# Patient Record
Sex: MALE | Race: ASIAN | NOT HISPANIC OR LATINO | Employment: FULL TIME | URBAN - METROPOLITAN AREA
[De-identification: names, ages, dates, MRNs, and addresses within clinical notes are randomized per-mention and may not be internally consistent; named-entity substitution may affect disease eponyms.]

---

## 2017-08-31 ENCOUNTER — HOSPITAL ENCOUNTER (OUTPATIENT)
Dept: RADIOLOGY | Facility: OTHER | Age: 42
Discharge: HOME | End: 2017-08-31
Attending: ORTHOPAEDIC SURGERY
Payer: COMMERCIAL

## 2017-08-31 DIAGNOSIS — M25.561 ACUTE PAIN OF RIGHT KNEE: Primary | ICD-10-CM

## 2017-08-31 DIAGNOSIS — M25.561 ACUTE PAIN OF RIGHT KNEE: ICD-10-CM

## 2017-08-31 PROCEDURE — 73721 MRI JNT OF LWR EXTRE W/O DYE: CPT | Mod: 26,RT,, | Performed by: RADIOLOGY

## 2017-08-31 PROCEDURE — 73721 MRI JNT OF LWR EXTRE W/O DYE: CPT | Mod: TC,RT

## 2017-08-31 RX ORDER — MELOXICAM 15 MG/1
15 TABLET ORAL DAILY
Qty: 30 TABLET | Refills: 2 | Status: SHIPPED | OUTPATIENT
Start: 2017-08-31 | End: 2021-12-22

## 2017-08-31 NOTE — TELEPHONE ENCOUNTER
Patient was evaluated my Dr. Valera.Positive IT band syndrome. + kandy sign    Mobic 15 mg once a day prn pain sent to pharmacy

## 2017-09-22 DIAGNOSIS — E78.01 FAMILIAL HYPERCHOLESTEROLEMIA: Primary | ICD-10-CM

## 2017-09-28 ENCOUNTER — LAB VISIT (OUTPATIENT)
Dept: LAB | Facility: OTHER | Age: 42
End: 2017-09-28
Attending: OPTOMETRIST
Payer: COMMERCIAL

## 2017-09-28 DIAGNOSIS — E78.01 FAMILIAL HYPERCHOLESTEROLEMIA: ICD-10-CM

## 2017-09-28 LAB
ALBUMIN SERPL BCP-MCNC: 4.4 G/DL
ALP SERPL-CCNC: 53 U/L
ALT SERPL W/O P-5'-P-CCNC: 20 U/L
ANION GAP SERPL CALC-SCNC: 8 MMOL/L
AST SERPL-CCNC: 16 U/L
BASOPHILS # BLD AUTO: 0.05 K/UL
BASOPHILS NFR BLD: 1 %
BILIRUB SERPL-MCNC: 0.5 MG/DL
BUN SERPL-MCNC: 18 MG/DL
CALCIUM SERPL-MCNC: 10.2 MG/DL
CHLORIDE SERPL-SCNC: 106 MMOL/L
CHOLEST SERPL-MCNC: 253 MG/DL
CHOLEST/HDLC SERPL: 4.3 {RATIO}
CO2 SERPL-SCNC: 26 MMOL/L
CREAT SERPL-MCNC: 1 MG/DL
DIFFERENTIAL METHOD: NORMAL
EOSINOPHIL # BLD AUTO: 0.2 K/UL
EOSINOPHIL NFR BLD: 4.4 %
ERYTHROCYTE [DISTWIDTH] IN BLOOD BY AUTOMATED COUNT: 12.9 %
EST. GFR  (AFRICAN AMERICAN): >60 ML/MIN/1.73 M^2
EST. GFR  (NON AFRICAN AMERICAN): >60 ML/MIN/1.73 M^2
GLUCOSE SERPL-MCNC: 116 MG/DL
HCT VFR BLD AUTO: 42.7 %
HDLC SERPL-MCNC: 59 MG/DL
HDLC SERPL: 23.3 %
HGB BLD-MCNC: 14.1 G/DL
LDLC SERPL CALC-MCNC: 170.8 MG/DL
LYMPHOCYTES # BLD AUTO: 1.7 K/UL
LYMPHOCYTES NFR BLD: 33.9 %
MCH RBC QN AUTO: 29 PG
MCHC RBC AUTO-ENTMCNC: 33 G/DL
MCV RBC AUTO: 88 FL
MONOCYTES # BLD AUTO: 0.3 K/UL
MONOCYTES NFR BLD: 5.5 %
NEUTROPHILS # BLD AUTO: 2.8 K/UL
NEUTROPHILS NFR BLD: 55 %
NONHDLC SERPL-MCNC: 194 MG/DL
PLATELET # BLD AUTO: 207 K/UL
PMV BLD AUTO: 11.3 FL
POTASSIUM SERPL-SCNC: 4.3 MMOL/L
PROT SERPL-MCNC: 7.8 G/DL
RBC # BLD AUTO: 4.87 M/UL
SODIUM SERPL-SCNC: 140 MMOL/L
TRIGL SERPL-MCNC: 116 MG/DL
WBC # BLD AUTO: 5.05 K/UL

## 2017-09-28 PROCEDURE — 80053 COMPREHEN METABOLIC PANEL: CPT

## 2017-09-28 PROCEDURE — 36415 COLL VENOUS BLD VENIPUNCTURE: CPT

## 2017-09-28 PROCEDURE — 85025 COMPLETE CBC W/AUTO DIFF WBC: CPT

## 2017-09-28 PROCEDURE — 80061 LIPID PANEL: CPT

## 2017-11-30 ENCOUNTER — OFFICE VISIT (OUTPATIENT)
Dept: PULMONOLOGY | Facility: CLINIC | Age: 42
End: 2017-11-30
Payer: COMMERCIAL

## 2017-11-30 VITALS
HEART RATE: 65 BPM | WEIGHT: 147.69 LBS | BODY MASS INDEX: 21.87 KG/M2 | OXYGEN SATURATION: 98 % | HEIGHT: 69 IN | SYSTOLIC BLOOD PRESSURE: 112 MMHG | DIASTOLIC BLOOD PRESSURE: 64 MMHG

## 2017-11-30 DIAGNOSIS — Z00.00 ANNUAL PHYSICAL EXAM: Primary | ICD-10-CM

## 2017-11-30 PROCEDURE — 99386 PREV VISIT NEW AGE 40-64: CPT | Mod: S$GLB,,, | Performed by: INTERNAL MEDICINE

## 2017-11-30 PROCEDURE — 99999 PR PBB SHADOW E&M-EST. PATIENT-LVL III: CPT | Mod: PBBFAC,,, | Performed by: INTERNAL MEDICINE

## 2017-11-30 NOTE — PROGRESS NOTES
Subjective:       Patient ID: Caroline Raphael MD is a 42 y.o. male.    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 He feels well, has given up jogging because of a pain in his knee. A work up has been negative. No other medical complaints. He did his labs in September and has an elevated cholesterol and LDL, all other parameters are normal. He was intolerant to statins. Will try diet, repeat labs in mid  January and see if he needs a medication.  Review of Systems   Constitutional: Negative.    HENT: Negative.    Eyes: Negative.    Respiratory: Negative.    Cardiovascular: Negative.         Type II hyperlipidemia   Gastrointestinal: Negative.    Genitourinary: Negative.    Musculoskeletal: Negative.         Sore knee aggravated by running   Skin: Negative.    Neurological: Negative.    Psychiatric/Behavioral: Negative.    All other systems reviewed and are negative.      Objective:      Physical Exam   Constitutional: He is oriented to person, place, and time. He appears well-developed and well-nourished.   HENT:   Head: Normocephalic and atraumatic.   Right Ear: External ear normal.   Left Ear: External ear normal.   Eyes: Conjunctivae and EOM are normal. Pupils are equal, round, and reactive to light.   Neck: Normal range of motion. Neck supple.   Cardiovascular: Normal rate, regular rhythm and normal heart sounds.    Pulmonary/Chest: Effort normal and breath sounds normal.   Peak flow 600 l/min   Abdominal: Soft. Bowel sounds are normal.   Musculoskeletal: Normal range of motion.   Neurological: He is alert and oriented to person, place, and time. He has normal reflexes.   Skin: Skin is warm and dry.   Psychiatric: He has a normal mood and affect. His behavior is normal. Judgment and thought content normal.       Assessment:       No diagnosis found.    Plan:        Labs in September, [id number], Cholesterol:253 and Triglycerides 171.  IMP: Type II  hyperlipidemia. Address with diet. Fit For Duty

## 2017-11-30 NOTE — LETTER
November 30, 2017    Caroline Raphael MD  [address] [facility] [address]             [facility] - Pul[facility] Services  [address]  [place] [address]  Phone: [phone] Dear Caroline,      Thank you for allowing me to serve you and perform your Executive Health exam on 11/30/2017. This letter will serve as a brief summary of the physical findings and laboratory/studies performed and recommendations at this time. At this visit the only abnormality is the cholesterol and LDL values. This should initially be addressed with diet and in mid January, repeat the lipid panel and then we can decide if you need a medication.          If you have any questions or concerns, please don't hesitate to call.    Sincerely,        Rock De La Cruz MD

## 2017-12-15 DIAGNOSIS — R10.11 RUQ PAIN: Primary | ICD-10-CM

## 2017-12-27 DIAGNOSIS — R10.11 RUQ PAIN: Primary | ICD-10-CM

## 2017-12-28 ENCOUNTER — HOSPITAL ENCOUNTER (OUTPATIENT)
Dept: RADIOLOGY | Facility: OTHER | Age: 42
Discharge: HOME | End: 2017-12-28
Attending: SPECIALIST
Payer: COMMERCIAL

## 2017-12-28 DIAGNOSIS — R10.11 RUQ PAIN: ICD-10-CM

## 2017-12-28 PROCEDURE — 76705 ECHO EXAM OF ABDOMEN: CPT | Mod: TC

## 2017-12-28 PROCEDURE — 76705 ECHO EXAM OF ABDOMEN: CPT | Mod: 26,,, | Performed by: RADIOLOGY

## 2018-06-21 ENCOUNTER — PATIENT MESSAGE (OUTPATIENT)
Dept: INFECTIOUS DISEASES | Facility: CLINIC | Age: 43
End: 2018-06-21

## 2019-06-04 DIAGNOSIS — R11.0 NAUSEA: Primary | ICD-10-CM

## 2019-06-04 RX ORDER — ONDANSETRON 4 MG/1
4 TABLET, FILM COATED ORAL 2 TIMES DAILY
Qty: 30 TABLET | Refills: 0 | Status: SHIPPED | OUTPATIENT
Start: 2019-06-04 | End: 2021-12-22

## 2019-06-04 RX ORDER — ONDANSETRON 4 MG/1
4 TABLET, FILM COATED ORAL 2 TIMES DAILY
Qty: 30 TABLET | Refills: 0 | Status: SHIPPED | OUTPATIENT
Start: 2019-06-04 | End: 2019-06-04 | Stop reason: SDUPTHER

## 2019-07-29 DIAGNOSIS — M76.31 IT BAND SYNDROME, RIGHT: Primary | ICD-10-CM

## 2019-07-29 RX ORDER — DICLOFENAC SODIUM 10 MG/G
2 GEL TOPICAL 4 TIMES DAILY
Qty: 1 TUBE | Refills: 2 | Status: SHIPPED | OUTPATIENT
Start: 2019-07-29 | End: 2021-12-22

## 2019-09-11 ENCOUNTER — CLINICAL SUPPORT (OUTPATIENT)
Dept: INTERNAL MEDICINE | Facility: CLINIC | Age: 44
End: 2019-09-11
Payer: COMMERCIAL

## 2019-09-11 DIAGNOSIS — Z00.00 ROUTINE GENERAL MEDICAL EXAMINATION AT A HEALTH CARE FACILITY: Primary | ICD-10-CM

## 2019-09-11 LAB
ALBUMIN SERPL BCP-MCNC: 4.5 G/DL (ref 3.5–5.2)
ALP SERPL-CCNC: 61 U/L (ref 55–135)
ALT SERPL W/O P-5'-P-CCNC: 15 U/L (ref 10–44)
ANION GAP SERPL CALC-SCNC: 8 MMOL/L (ref 8–16)
AST SERPL-CCNC: 17 U/L (ref 10–40)
BILIRUB SERPL-MCNC: 0.8 MG/DL (ref 0.1–1)
BUN SERPL-MCNC: 18 MG/DL (ref 6–20)
CALCIUM SERPL-MCNC: 10.3 MG/DL (ref 8.7–10.5)
CHLORIDE SERPL-SCNC: 106 MMOL/L (ref 95–110)
CHOLEST SERPL-MCNC: 220 MG/DL (ref 120–199)
CHOLEST/HDLC SERPL: 2.9 {RATIO} (ref 2–5)
CO2 SERPL-SCNC: 27 MMOL/L (ref 23–29)
CREAT SERPL-MCNC: 1.1 MG/DL (ref 0.5–1.4)
ERYTHROCYTE [DISTWIDTH] IN BLOOD BY AUTOMATED COUNT: 13 % (ref 11.5–14.5)
EST. GFR  (AFRICAN AMERICAN): >60 ML/MIN/1.73 M^2
EST. GFR  (NON AFRICAN AMERICAN): >60 ML/MIN/1.73 M^2
ESTIMATED AVG GLUCOSE: 105 MG/DL (ref 68–131)
GLUCOSE SERPL-MCNC: 90 MG/DL (ref 70–110)
HBA1C MFR BLD HPLC: 5.3 % (ref 4–5.6)
HCT VFR BLD AUTO: 46.4 % (ref 40–54)
HDLC SERPL-MCNC: 76 MG/DL (ref 40–75)
HDLC SERPL: 34.5 % (ref 20–50)
HGB BLD-MCNC: 14.6 G/DL (ref 14–18)
LDLC SERPL CALC-MCNC: 126.6 MG/DL (ref 63–159)
MCH RBC QN AUTO: 28.7 PG (ref 27–31)
MCHC RBC AUTO-ENTMCNC: 31.5 G/DL (ref 32–36)
MCV RBC AUTO: 91 FL (ref 82–98)
NONHDLC SERPL-MCNC: 144 MG/DL
PLATELET # BLD AUTO: 229 K/UL (ref 150–350)
PMV BLD AUTO: 10.2 FL (ref 9.2–12.9)
POTASSIUM SERPL-SCNC: 4.3 MMOL/L (ref 3.5–5.1)
PROT SERPL-MCNC: 7.6 G/DL (ref 6–8.4)
RBC # BLD AUTO: 5.08 M/UL (ref 4.6–6.2)
SODIUM SERPL-SCNC: 141 MMOL/L (ref 136–145)
TRIGL SERPL-MCNC: 87 MG/DL (ref 30–150)
TSH SERPL DL<=0.005 MIU/L-ACNC: 0.89 UIU/ML (ref 0.4–4)
WBC # BLD AUTO: 11.26 K/UL (ref 3.9–12.7)

## 2019-09-11 PROCEDURE — 85027 COMPLETE CBC AUTOMATED: CPT

## 2019-09-11 PROCEDURE — 80053 COMPREHEN METABOLIC PANEL: CPT

## 2019-09-11 PROCEDURE — 36415 COLL VENOUS BLD VENIPUNCTURE: CPT

## 2019-09-11 PROCEDURE — 80061 LIPID PANEL: CPT

## 2019-09-11 PROCEDURE — 84443 ASSAY THYROID STIM HORMONE: CPT

## 2019-09-11 PROCEDURE — 83036 HEMOGLOBIN GLYCOSYLATED A1C: CPT

## 2019-09-12 ENCOUNTER — OFFICE VISIT (OUTPATIENT)
Dept: PULMONOLOGY | Facility: CLINIC | Age: 44
End: 2019-09-12
Payer: COMMERCIAL

## 2019-09-12 VITALS
HEIGHT: 69 IN | BODY MASS INDEX: 20.59 KG/M2 | HEART RATE: 89 BPM | SYSTOLIC BLOOD PRESSURE: 120 MMHG | DIASTOLIC BLOOD PRESSURE: 80 MMHG | WEIGHT: 139 LBS

## 2019-09-12 DIAGNOSIS — Z00.00 ANNUAL PHYSICAL EXAM: Primary | ICD-10-CM

## 2019-09-12 PROCEDURE — 99999 PR PBB SHADOW E&M-EST. PATIENT-LVL III: ICD-10-PCS | Mod: PBBFAC,,, | Performed by: INTERNAL MEDICINE

## 2019-09-12 PROCEDURE — 99386 PR PREVENTIVE VISIT,NEW,40-64: ICD-10-PCS | Mod: S$GLB,,, | Performed by: INTERNAL MEDICINE

## 2019-09-12 PROCEDURE — 99999 PR PBB SHADOW E&M-EST. PATIENT-LVL III: CPT | Mod: PBBFAC,,, | Performed by: INTERNAL MEDICINE

## 2019-09-12 PROCEDURE — 99386 PREV VISIT NEW AGE 40-64: CPT | Mod: S$GLB,,, | Performed by: INTERNAL MEDICINE

## 2019-09-12 NOTE — LETTER
September 12, 2019    Caroline Raphael MD  [address] [facility] [address]             [facility] - Pulmonary Services  [address]  [place] [address]  Phone: [phone] Dear Caroline,     Thank you for allowing me to serve you and perform your Executive Health exam on 9/12/2019. This letter will serve as a brief summary of the physical findings and laboratory/studies performed and recommendations at this time. Today's assessment is essentially normal. You have done a great job improving your lipids.     Talk to Pato, he can make it happen.      If you have any questions or concerns, please don't hesitate to call.    Sincerely,        Rock De La Cruz MD

## 2019-09-12 NOTE — LETTER
September 12, 2019    Caroline Raphael MD  [address] [facility] [address]             [facility] - Pulmonary Services  [address]  [place] [address]  Phone: [phone] Dear Caroline,      Thank you for allowing me to serve you and perform your Executive Health exam on 9/12/2019. This letter will serve as a brief summary of the physical findings and laboratory/studies performed and recommendations at this time. Today's assessment is essentially normal.  You have had a dramatic improvement in your lipid profile. Good job.     See Pato, He can make it happen.        If you have any questions or concerns, please don't hesitate to call.    Sincerely,        Rock De La Cruz MD

## 2019-09-12 NOTE — PROGRESS NOTES
Subjective:       Patient ID: Caroline Raphael MD is a 44 y.o. male.    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 He feels well, has changes his diet and exercising a bit more at the [facility] that is across the street from his house. Takes no prescription meds and has no medical complaints.  Review of Systems   Constitutional: Negative.    HENT: Negative.    Eyes: Negative.    Respiratory: Negative.    Cardiovascular: Negative.    Gastrointestinal: Negative.    Genitourinary: Negative.    Musculoskeletal: Negative.    Skin: Negative.    Neurological: Negative.    Psychiatric/Behavioral: Negative.    All other systems reviewed and are negative.      Objective:      Physical Exam   Constitutional: He is oriented to person, place, and time. He appears well-developed and well-nourished.   HENT:   Head: Normocephalic and atraumatic.   Right Ear: External ear normal.   Left Ear: External ear normal.   Eyes: Pupils are equal, round, and reactive to light. Conjunctivae and EOM are normal.   Neck: Normal range of motion. Neck supple.   Cardiovascular: Normal rate, regular rhythm and normal heart sounds.   Pulmonary/Chest: Effort normal and breath sounds normal.   Peak flow 650 l/min   Abdominal: Soft. Bowel sounds are normal.   Musculoskeletal: Normal range of motion.   Neurological: He is alert and oriented to person, place, and time. He has normal reflexes.   Skin: Skin is warm and dry.   Psychiatric: He has a normal mood and affect. His behavior is normal. Judgment and thought content normal.       Assessment:       1. Annual physical exam        Plan:       Labs: Cholesterol: 220 down from 253 and LDL: 127  Down from 171. All other parameters are normal. IMP: Healthy male with much improved lipid profile.

## 2020-01-30 ENCOUNTER — HOSPITAL ENCOUNTER (OUTPATIENT)
Dept: RADIOLOGY | Facility: OTHER | Age: 45
Discharge: HOME | End: 2020-01-30
Attending: ORTHOPAEDIC SURGERY
Payer: COMMERCIAL

## 2020-01-30 DIAGNOSIS — S83.271A COMPLEX TEAR OF LATERAL MENISCUS, CURRENT INJURY, RIGHT KNEE, INITIAL ENCOUNTER: ICD-10-CM

## 2020-01-30 DIAGNOSIS — M25.561 PAIN IN RIGHT KNEE: ICD-10-CM

## 2020-01-30 PROCEDURE — 73721 MRI JNT OF LWR EXTRE W/O DYE: CPT | Mod: TC,RT

## 2020-01-30 PROCEDURE — 73721 MRI JNT OF LWR EXTRE W/O DYE: CPT | Mod: 26,RT,, | Performed by: RADIOLOGY

## 2020-01-30 PROCEDURE — 73721 MRI KNEE WITHOUT CONTRAST RIGHT: ICD-10-PCS | Mod: 26,RT,, | Performed by: RADIOLOGY

## 2020-04-21 DIAGNOSIS — Z01.84 ANTIBODY RESPONSE EXAMINATION: ICD-10-CM

## 2020-04-22 ENCOUNTER — LAB VISIT (OUTPATIENT)
Dept: LAB | Facility: OTHER | Age: 45
End: 2020-04-22
Attending: INTERNAL MEDICINE
Payer: COMMERCIAL

## 2020-04-22 DIAGNOSIS — Z01.84 ANTIBODY RESPONSE EXAMINATION: ICD-10-CM

## 2020-04-22 LAB — SARS-COV-2 IGG SERPL QL IA: NEGATIVE

## 2020-04-22 PROCEDURE — 36415 COLL VENOUS BLD VENIPUNCTURE: CPT

## 2020-04-22 PROCEDURE — 86769 SARS-COV-2 COVID-19 ANTIBODY: CPT

## 2020-05-11 ENCOUNTER — DOCUMENTATION ONLY (OUTPATIENT)
Dept: PAIN MEDICINE | Facility: CLINIC | Age: 45
End: 2020-05-11

## 2020-11-01 RX ORDER — TERBINAFINE HYDROCHLORIDE 250 MG/1
250 TABLET ORAL DAILY
Qty: 90 TABLET | Refills: 0 | Status: SHIPPED | OUTPATIENT
Start: 2020-11-01 | End: 2021-01-30

## 2020-11-23 ENCOUNTER — LAB VISIT (OUTPATIENT)
Dept: LAB | Facility: OTHER | Age: 45
End: 2020-11-23
Attending: OPHTHALMOLOGY
Payer: COMMERCIAL

## 2020-11-23 DIAGNOSIS — Z01.84 ENCOUNTER FOR ANTIBODY RESPONSE EXAMINATION: ICD-10-CM

## 2020-11-23 LAB — SARS-COV-2 IGG SERPLBLD QL IA.RAPID: NEGATIVE

## 2020-11-23 PROCEDURE — 86769 SARS-COV-2 COVID-19 ANTIBODY: CPT

## 2020-11-23 PROCEDURE — 36415 COLL VENOUS BLD VENIPUNCTURE: CPT

## 2020-12-23 ENCOUNTER — IMMUNIZATION (OUTPATIENT)
Dept: INTERNAL MEDICINE | Facility: CLINIC | Age: 45
End: 2020-12-23
Payer: COMMERCIAL

## 2020-12-23 DIAGNOSIS — Z23 NEED FOR VACCINATION: ICD-10-CM

## 2020-12-23 PROCEDURE — 91300 COVID-19, MRNA, LNP-S, PF, 30 MCG/0.3 ML DOSE VACCINE: CPT | Mod: ,,, | Performed by: INTERNAL MEDICINE

## 2020-12-23 PROCEDURE — 0001A COVID-19, MRNA, LNP-S, PF, 30 MCG/0.3 ML DOSE VACCINE: CPT | Mod: CV19,,, | Performed by: INTERNAL MEDICINE

## 2020-12-23 PROCEDURE — 91300 COVID-19, MRNA, LNP-S, PF, 30 MCG/0.3 ML DOSE VACCINE: ICD-10-PCS | Mod: ,,, | Performed by: INTERNAL MEDICINE

## 2020-12-23 PROCEDURE — 0001A COVID-19, MRNA, LNP-S, PF, 30 MCG/0.3 ML DOSE VACCINE: ICD-10-PCS | Mod: CV19,,, | Performed by: INTERNAL MEDICINE

## 2020-12-24 ENCOUNTER — CLINICAL SUPPORT (OUTPATIENT)
Dept: URGENT CARE | Facility: CLINIC | Age: 45
End: 2020-12-24
Payer: COMMERCIAL

## 2020-12-24 DIAGNOSIS — Z11.9 SCREENING EXAMINATION FOR UNSPECIFIED INFECTIOUS DISEASE: Primary | ICD-10-CM

## 2020-12-24 LAB
CTP QC/QA: YES
SARS-COV-2 RDRP RESP QL NAA+PROBE: NEGATIVE

## 2020-12-24 PROCEDURE — U0002: ICD-10-PCS | Mod: QW,S$GLB,, | Performed by: FAMILY MEDICINE

## 2020-12-24 PROCEDURE — U0002 COVID-19 LAB TEST NON-CDC: HCPCS | Mod: QW,S$GLB,, | Performed by: FAMILY MEDICINE

## 2020-12-24 PROCEDURE — 99211 PR OFFICE/OUTPT VISIT, EST, LEVL I: ICD-10-PCS | Mod: S$GLB,CS,, | Performed by: FAMILY MEDICINE

## 2020-12-24 PROCEDURE — 99211 OFF/OP EST MAY X REQ PHY/QHP: CPT | Mod: S$GLB,CS,, | Performed by: FAMILY MEDICINE

## 2021-04-13 ENCOUNTER — OFFICE VISIT (OUTPATIENT)
Dept: SLEEP MEDICINE | Facility: CLINIC | Age: 46
End: 2021-04-13
Payer: COMMERCIAL

## 2021-04-13 VITALS
HEART RATE: 60 BPM | WEIGHT: 139 LBS | BODY MASS INDEX: 20.59 KG/M2 | HEIGHT: 69 IN | SYSTOLIC BLOOD PRESSURE: 117 MMHG | DIASTOLIC BLOOD PRESSURE: 74 MMHG

## 2021-04-13 DIAGNOSIS — R06.83 SNORING: ICD-10-CM

## 2021-04-13 DIAGNOSIS — R53.83 FATIGUE, UNSPECIFIED TYPE: Primary | ICD-10-CM

## 2021-04-13 PROCEDURE — 1126F PR PAIN SEVERITY QUANTIFIED, NO PAIN PRESENT: ICD-10-PCS | Mod: S$GLB,,, | Performed by: INTERNAL MEDICINE

## 2021-04-13 PROCEDURE — 99204 OFFICE O/P NEW MOD 45 MIN: CPT | Mod: S$GLB,,, | Performed by: INTERNAL MEDICINE

## 2021-04-13 PROCEDURE — 99999 PR PBB SHADOW E&M-EST. PATIENT-LVL III: ICD-10-PCS | Mod: PBBFAC,,, | Performed by: INTERNAL MEDICINE

## 2021-04-13 PROCEDURE — 1126F AMNT PAIN NOTED NONE PRSNT: CPT | Mod: S$GLB,,, | Performed by: INTERNAL MEDICINE

## 2021-04-13 PROCEDURE — 99204 PR OFFICE/OUTPT VISIT, NEW, LEVL IV, 45-59 MIN: ICD-10-PCS | Mod: S$GLB,,, | Performed by: INTERNAL MEDICINE

## 2021-04-13 PROCEDURE — 3008F BODY MASS INDEX DOCD: CPT | Mod: CPTII,S$GLB,, | Performed by: INTERNAL MEDICINE

## 2021-04-13 PROCEDURE — 99999 PR PBB SHADOW E&M-EST. PATIENT-LVL III: CPT | Mod: PBBFAC,,, | Performed by: INTERNAL MEDICINE

## 2021-04-13 PROCEDURE — 3008F PR BODY MASS INDEX (BMI) DOCUMENTED: ICD-10-PCS | Mod: CPTII,S$GLB,, | Performed by: INTERNAL MEDICINE

## 2021-04-15 ENCOUNTER — TELEPHONE (OUTPATIENT)
Dept: SLEEP MEDICINE | Facility: OTHER | Age: 46
End: 2021-04-15

## 2021-04-30 ENCOUNTER — TELEPHONE (OUTPATIENT)
Dept: SLEEP MEDICINE | Facility: OTHER | Age: 46
End: 2021-04-30

## 2021-05-20 ENCOUNTER — TELEPHONE (OUTPATIENT)
Dept: SLEEP MEDICINE | Facility: OTHER | Age: 46
End: 2021-05-20

## 2021-06-08 ENCOUNTER — TELEPHONE (OUTPATIENT)
Dept: SLEEP MEDICINE | Facility: OTHER | Age: 46
End: 2021-06-08

## 2021-06-17 ENCOUNTER — TELEPHONE (OUTPATIENT)
Dept: SLEEP MEDICINE | Facility: OTHER | Age: 46
End: 2021-06-17

## 2021-07-19 ENCOUNTER — LAB VISIT (OUTPATIENT)
Dept: LAB | Facility: HOSPITAL | Age: 46
End: 2021-07-19
Attending: OPHTHALMOLOGY
Payer: COMMERCIAL

## 2021-07-19 DIAGNOSIS — Z20.822 ENCOUNTER FOR LABORATORY TESTING FOR COVID-19 VIRUS: ICD-10-CM

## 2021-07-19 LAB — SARS-COV-2 RDRP RESP QL NAA+PROBE: NEGATIVE

## 2021-07-19 PROCEDURE — U0002 COVID-19 LAB TEST NON-CDC: HCPCS | Performed by: OPHTHALMOLOGY

## 2021-08-13 ENCOUNTER — HOSPITAL ENCOUNTER (OUTPATIENT)
Dept: PREADMISSION TESTING | Facility: OTHER | Age: 46
Discharge: HOME | End: 2021-08-13
Attending: ANESTHESIOLOGY
Payer: COMMERCIAL

## 2021-08-13 DIAGNOSIS — R05.9 COUGH: ICD-10-CM

## 2021-08-13 DIAGNOSIS — Z01.818 PRE-OP TESTING: ICD-10-CM

## 2021-08-13 LAB — SARS-COV-2 RDRP RESP QL NAA+PROBE: NEGATIVE

## 2021-08-13 PROCEDURE — U0002 COVID-19 LAB TEST NON-CDC: HCPCS | Performed by: OPHTHALMOLOGY

## 2021-08-16 ENCOUNTER — HOSPITAL ENCOUNTER (OUTPATIENT)
Dept: PREADMISSION TESTING | Facility: OTHER | Age: 46
Discharge: HOME | End: 2021-08-16
Attending: OPHTHALMOLOGY
Payer: COMMERCIAL

## 2021-08-16 DIAGNOSIS — R51.9 HEAD ACHE: ICD-10-CM

## 2021-08-16 DIAGNOSIS — R05.9 COUGH: ICD-10-CM

## 2021-08-16 DIAGNOSIS — Z01.818 PRE-OP TESTING: ICD-10-CM

## 2021-08-16 LAB — SARS-COV-2 RDRP RESP QL NAA+PROBE: NEGATIVE

## 2021-08-16 PROCEDURE — U0002 COVID-19 LAB TEST NON-CDC: HCPCS | Performed by: OPHTHALMOLOGY

## 2021-09-17 ENCOUNTER — IMMUNIZATION (OUTPATIENT)
Dept: INTERNAL MEDICINE | Facility: CLINIC | Age: 46
End: 2021-09-17
Payer: COMMERCIAL

## 2021-09-17 DIAGNOSIS — Z23 NEED FOR VACCINATION: Primary | ICD-10-CM

## 2021-09-17 PROCEDURE — 91300 COVID-19, MRNA, LNP-S, PF, 30 MCG/0.3 ML DOSE VACCINE: CPT | Mod: ,,, | Performed by: INTERNAL MEDICINE

## 2021-09-17 PROCEDURE — 0003A COVID-19, MRNA, LNP-S, PF, 30 MCG/0.3 ML DOSE VACCINE: ICD-10-PCS | Mod: CV19,,, | Performed by: INTERNAL MEDICINE

## 2021-09-17 PROCEDURE — 0003A COVID-19, MRNA, LNP-S, PF, 30 MCG/0.3 ML DOSE VACCINE: CPT | Mod: CV19,,, | Performed by: INTERNAL MEDICINE

## 2021-09-17 PROCEDURE — 91300 COVID-19, MRNA, LNP-S, PF, 30 MCG/0.3 ML DOSE VACCINE: ICD-10-PCS | Mod: ,,, | Performed by: INTERNAL MEDICINE

## 2021-12-22 ENCOUNTER — HOSPITAL ENCOUNTER (OUTPATIENT)
Dept: PREADMISSION TESTING | Facility: OTHER | Age: 46
Discharge: HOME | End: 2021-12-22
Attending: ANESTHESIOLOGY

## 2021-12-22 ENCOUNTER — OFFICE VISIT (OUTPATIENT)
Dept: INTERNAL MEDICINE | Facility: CLINIC | Age: 46
End: 2021-12-22
Payer: COMMERCIAL

## 2021-12-22 ENCOUNTER — PATIENT MESSAGE (OUTPATIENT)
Dept: INTERNAL MEDICINE | Facility: CLINIC | Age: 46
End: 2021-12-22
Payer: COMMERCIAL

## 2021-12-22 ENCOUNTER — CLINICAL SUPPORT (OUTPATIENT)
Dept: INTERNAL MEDICINE | Facility: CLINIC | Age: 46
End: 2021-12-22
Payer: COMMERCIAL

## 2021-12-22 VITALS
BODY MASS INDEX: 22.1 KG/M2 | WEIGHT: 149.19 LBS | DIASTOLIC BLOOD PRESSURE: 75 MMHG | HEIGHT: 69 IN | HEART RATE: 56 BPM | SYSTOLIC BLOOD PRESSURE: 110 MMHG

## 2021-12-22 DIAGNOSIS — R69 SICK: Primary | ICD-10-CM

## 2021-12-22 DIAGNOSIS — B35.1 ONYCHOMYCOSIS: Primary | ICD-10-CM

## 2021-12-22 DIAGNOSIS — Z12.11 COLON CANCER SCREENING: ICD-10-CM

## 2021-12-22 DIAGNOSIS — Z00.00 ROUTINE GENERAL MEDICAL EXAMINATION AT A HEALTH CARE FACILITY: Primary | ICD-10-CM

## 2021-12-22 DIAGNOSIS — Z00.00 ENCOUNTER FOR ANNUAL HEALTH EXAMINATION: ICD-10-CM

## 2021-12-22 LAB
ALBUMIN SERPL BCP-MCNC: 4.1 G/DL (ref 3.5–5.2)
ALP SERPL-CCNC: 56 U/L (ref 55–135)
ALT SERPL W/O P-5'-P-CCNC: 26 U/L (ref 10–44)
ANION GAP SERPL CALC-SCNC: 9 MMOL/L (ref 8–16)
AST SERPL-CCNC: 19 U/L (ref 10–40)
BILIRUB SERPL-MCNC: 0.4 MG/DL (ref 0.1–1)
BUN SERPL-MCNC: 22 MG/DL (ref 6–20)
CALCIUM SERPL-MCNC: 9.8 MG/DL (ref 8.7–10.5)
CHLORIDE SERPL-SCNC: 108 MMOL/L (ref 95–110)
CHOLEST SERPL-MCNC: 257 MG/DL (ref 120–199)
CHOLEST/HDLC SERPL: 3.6 {RATIO} (ref 2–5)
CO2 SERPL-SCNC: 25 MMOL/L (ref 23–29)
CREAT SERPL-MCNC: 1 MG/DL (ref 0.5–1.4)
ERYTHROCYTE [DISTWIDTH] IN BLOOD BY AUTOMATED COUNT: 12.3 % (ref 11.5–14.5)
EST. GFR  (AFRICAN AMERICAN): >60 ML/MIN/1.73 M^2
EST. GFR  (NON AFRICAN AMERICAN): >60 ML/MIN/1.73 M^2
ESTIMATED AVG GLUCOSE: 111 MG/DL (ref 68–131)
GLUCOSE SERPL-MCNC: 89 MG/DL (ref 70–110)
HBA1C MFR BLD: 5.5 % (ref 4–5.6)
HCT VFR BLD AUTO: 41.2 % (ref 40–54)
HCV AB SERPL QL IA: NEGATIVE
HDLC SERPL-MCNC: 71 MG/DL (ref 40–75)
HDLC SERPL: 27.6 % (ref 20–50)
HGB BLD-MCNC: 13.4 G/DL (ref 14–18)
LDLC SERPL CALC-MCNC: 165.6 MG/DL (ref 63–159)
MCH RBC QN AUTO: 28.7 PG (ref 27–31)
MCHC RBC AUTO-ENTMCNC: 32.5 G/DL (ref 32–36)
MCV RBC AUTO: 88 FL (ref 82–98)
NONHDLC SERPL-MCNC: 186 MG/DL
PLATELET # BLD AUTO: 230 K/UL (ref 150–450)
PMV BLD AUTO: 10.8 FL (ref 9.2–12.9)
POTASSIUM SERPL-SCNC: 4.7 MMOL/L (ref 3.5–5.1)
PROT SERPL-MCNC: 6.9 G/DL (ref 6–8.4)
RBC # BLD AUTO: 4.67 M/UL (ref 4.6–6.2)
SARS-COV-2 RNA RESP QL NAA+PROBE: NOT DETECTED
SARS-COV-2- CYCLE NUMBER: NORMAL
SODIUM SERPL-SCNC: 142 MMOL/L (ref 136–145)
TRIGL SERPL-MCNC: 102 MG/DL (ref 30–150)
TSH SERPL DL<=0.005 MIU/L-ACNC: 1.94 UIU/ML (ref 0.4–4)
WBC # BLD AUTO: 5.2 K/UL (ref 3.9–12.7)

## 2021-12-22 PROCEDURE — 80061 LIPID PANEL: CPT | Performed by: INTERNAL MEDICINE

## 2021-12-22 PROCEDURE — 3074F SYST BP LT 130 MM HG: CPT | Mod: CPTII,S$GLB,, | Performed by: INTERNAL MEDICINE

## 2021-12-22 PROCEDURE — U0003 INFECTIOUS AGENT DETECTION BY NUCLEIC ACID (DNA OR RNA); SEVERE ACUTE RESPIRATORY SYNDROME CORONAVIRUS 2 (SARS-COV-2) (CORONAVIRUS DISEASE [COVID-19]), AMPLIFIED PROBE TECHNIQUE, MAKING USE OF HIGH THROUGHPUT TECHNOLOGIES AS DESCRIBED BY CMS-2020-01-R: HCPCS | Performed by: ANESTHESIOLOGY

## 2021-12-22 PROCEDURE — 99999 PR PBB SHADOW E&M-EST. PATIENT-LVL III: ICD-10-PCS | Mod: PBBFAC,,, | Performed by: INTERNAL MEDICINE

## 2021-12-22 PROCEDURE — 86803 HEPATITIS C AB TEST: CPT | Performed by: INTERNAL MEDICINE

## 2021-12-22 PROCEDURE — 3078F PR MOST RECENT DIASTOLIC BLOOD PRESSURE < 80 MM HG: ICD-10-PCS | Mod: CPTII,S$GLB,, | Performed by: INTERNAL MEDICINE

## 2021-12-22 PROCEDURE — 83036 HEMOGLOBIN GLYCOSYLATED A1C: CPT | Performed by: INTERNAL MEDICINE

## 2021-12-22 PROCEDURE — 99999 PR PBB SHADOW E&M-EST. PATIENT-LVL III: CPT | Mod: PBBFAC,,, | Performed by: INTERNAL MEDICINE

## 2021-12-22 PROCEDURE — 3008F BODY MASS INDEX DOCD: CPT | Mod: CPTII,S$GLB,, | Performed by: INTERNAL MEDICINE

## 2021-12-22 PROCEDURE — 99396 PREV VISIT EST AGE 40-64: CPT | Mod: S$GLB,,, | Performed by: INTERNAL MEDICINE

## 2021-12-22 PROCEDURE — 3008F PR BODY MASS INDEX (BMI) DOCUMENTED: ICD-10-PCS | Mod: CPTII,S$GLB,, | Performed by: INTERNAL MEDICINE

## 2021-12-22 PROCEDURE — 99396 PR PREVENTIVE VISIT,EST,40-64: ICD-10-PCS | Mod: S$GLB,,, | Performed by: INTERNAL MEDICINE

## 2021-12-22 PROCEDURE — 85027 COMPLETE CBC AUTOMATED: CPT | Performed by: INTERNAL MEDICINE

## 2021-12-22 PROCEDURE — 3078F DIAST BP <80 MM HG: CPT | Mod: CPTII,S$GLB,, | Performed by: INTERNAL MEDICINE

## 2021-12-22 PROCEDURE — 80053 COMPREHEN METABOLIC PANEL: CPT | Performed by: INTERNAL MEDICINE

## 2021-12-22 PROCEDURE — 84443 ASSAY THYROID STIM HORMONE: CPT | Performed by: INTERNAL MEDICINE

## 2021-12-22 PROCEDURE — 3074F PR MOST RECENT SYSTOLIC BLOOD PRESSURE < 130 MM HG: ICD-10-PCS | Mod: CPTII,S$GLB,, | Performed by: INTERNAL MEDICINE

## 2021-12-22 RX ORDER — TERBINAFINE HYDROCHLORIDE 250 MG/1
250 TABLET ORAL DAILY
Qty: 90 TABLET | Refills: 1 | Status: SHIPPED | OUTPATIENT
Start: 2021-12-22 | End: 2022-05-05

## 2021-12-22 RX ORDER — CICLOPIROX 80 MG/ML
SOLUTION TOPICAL NIGHTLY
Qty: 6.6 ML | Refills: 11 | Status: SHIPPED | OUTPATIENT
Start: 2021-12-22 | End: 2023-10-04 | Stop reason: SDUPTHER

## 2021-12-22 NOTE — LETTER
12/22/2021    Caroline Raphael MD  [address] [facility] [address]       [facility]  [address], [address]  [facility] [address]  Phone: [phone]  Fax: [phone] Dear Dr. Raphael:    Thank you for allowing me to serve you and perform your Executive Health exam on 12/22/2021.  This letter will serve as a brief summary of our discussions at that time.      Past Medical History:   Diagnosis Date    Chronic pain of right knee     Possible iliotibial band syndrome (normal MRI)    Hyperlipidemia     Occipital neuralgia     Onychomycosis        History reviewed. No pertinent surgical history.    Immunization History   Administered Date(s) Administered    COVID-19, MRNA, LN-S, PF (Pfizer) 12/23/2020, 01/12/2021, 09/17/2021       Assessment/Recommendations:    Health Maintenance updates:  - Tdap update at next Employee Health visit.    - Hep C screen negative.    - Discussed colon cancer screening. Agreed on FIT stool testing this year. Repeat screening 1 year if normal.    - Blood pressure normal.  - A1c high side of normal. Focus on diet with sugar and carb moderation. Repeat A1c 1 year.       Hyperlipidemia - Moderate LDL elevation. Focus on diet with limitation of saturated fats. Repeat lipids 1 year.     Onychomycosis - Starting Terbinafine 250 mg daily x 6 months, Topical Ciclopirox for 6 to 12 months.      It was a pleasure seeing you for your health exam, Dr. Raphael.    If you have any questions or concerns, please don't hesitate to call.    Sincerely,    Gabriele Stephens MD

## 2022-01-04 NOTE — PROGRESS NOTES
Subjective:       Patient ID: Caroline Raphael MD is a 46 y.o. male.    Chief Complaint: Executive Health      HPI  Annual health exam. Reviewed medical, surgical, social and family history, medications, appropriate preventive health screenings, as well as vaccination history. Updates as noted below or in assessment and plan.    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 Does note onychomycosis of feet returning. Fair results with oral terbinafine in past and would like to retry.    Review of Systems   Constitutional: Negative for chills, fatigue and fever.   HENT: Negative for hearing loss and sinus pain.    Eyes: Negative for visual disturbance.   Respiratory: Negative for cough, chest tightness and shortness of breath.    Cardiovascular: Negative for chest pain and leg swelling.   Gastrointestinal: Negative for abdominal pain, blood in stool, diarrhea and nausea.   Genitourinary: Negative for difficulty urinating, dysuria and frequency.   Musculoskeletal: Negative for arthralgias, gait problem and joint swelling.   Skin: Negative for rash and wound.   Neurological: Negative for dizziness, syncope, weakness, numbness and headaches.   Psychiatric/Behavioral: Negative for dysphoric mood. The patient is not nervous/anxious.        Past Medical History:   Diagnosis Date    Chronic pain of right knee     Possible iliotibial band syndrome (normal MRI)    Hyperlipidemia     Occipital neuralgia     Onychomycosis          Current Outpatient Medications:     ciclopirox (PENLAC) 8 % Soln, Apply topically nightly., Disp: 6.6 mL, Rfl: 11    terbinafine HCL (LAMISIL) 250 mg tablet, Take 1 tablet (250 mg total) by mouth once daily., Disp: 90 tablet, Rfl: 1    History reviewed. No pertinent surgical history.    Family History   Problem Relation Age of Onset    Multiple myeloma Mother     Heart disease Father     Colon cancer Neg Hx        Social History     Tobacco Use    Smoking status: Never Smoker     Smokeless tobacco: Never Used   Substance Use Topics    Alcohol use: Yes    Drug use: Never       Immunization History   Administered Date(s) Administered    COVID-19, MRNA, LN-S, PF (Pfizer) 12/23/2020, 01/12/2021, 09/17/2021         Objective:      Vitals:    12/22/21 0845   BP: 110/75   Pulse: (!) 56       Physical Exam  Constitutional:       General: He is not in acute distress.     Appearance: Normal appearance. He is well-developed. He is not ill-appearing.   HENT:      Head: Normocephalic and atraumatic.      Right Ear: Hearing normal.      Left Ear: Hearing normal.   Eyes:      Extraocular Movements: Extraocular movements intact.      Conjunctiva/sclera: Conjunctivae normal.   Cardiovascular:      Rate and Rhythm: Normal rate and regular rhythm.      Heart sounds: Normal heart sounds. No murmur heard.      Pulmonary:      Effort: Pulmonary effort is normal. No respiratory distress.      Breath sounds: Normal breath sounds. No wheezing, rhonchi or rales.   Abdominal:      General: Abdomen is flat. There is no distension.      Palpations: Abdomen is soft.   Musculoskeletal:         General: No swelling or deformity.      Cervical back: No tenderness.      Right lower leg: No edema.      Left lower leg: No edema.   Lymphadenopathy:      Cervical: No cervical adenopathy.   Skin:     General: Skin is warm and dry.      Findings: No lesion or rash.   Neurological:      General: No focal deficit present.      Mental Status: He is alert and oriented to person, place, and time.      Cranial Nerves: No cranial nerve deficit.      Coordination: Coordination normal.      Gait: Gait normal.   Psychiatric:         Mood and Affect: Mood normal.         Behavior: Behavior normal.         Thought Content: Thought content normal.         Judgment: Judgment normal.         Recent Results (from the past 2016 hour(s))   COVID-19 Routine Screening    Collection Time: 12/22/21  7:46 AM   Result Value Ref Range    SARS-CoV2  (COVID-19) Qualitative PCR Not Detected Not Detected   SARS-COV-2- Cycle Number    Collection Time: 12/22/21  7:46 AM   Result Value Ref Range    SARS-COV-2- Cycle Number N/A    Comprehensive metabolic panel    Collection Time: 12/22/21  8:42 AM   Result Value Ref Range    Sodium 142 136 - 145 mmol/L    Potassium 4.7 3.5 - 5.1 mmol/L    Chloride 108 95 - 110 mmol/L    CO2 25 23 - 29 mmol/L    Glucose 89 70 - 110 mg/dL    BUN 22 (H) 6 - 20 mg/dL    Creatinine 1.0 0.5 - 1.4 mg/dL    Calcium 9.8 8.7 - 10.5 mg/dL    Total Protein 6.9 6.0 - 8.4 g/dL    Albumin 4.1 3.5 - 5.2 g/dL    Total Bilirubin 0.4 0.1 - 1.0 mg/dL    Alkaline Phosphatase 56 55 - 135 U/L    AST 19 10 - 40 U/L    ALT 26 10 - 44 U/L    Anion Gap 9 8 - 16 mmol/L    eGFR if African American >60.0 >60 mL/min/1.73 m^2    eGFR if non African American >60.0 >60 mL/min/1.73 m^2   CBC Without Differential    Collection Time: 12/22/21  8:42 AM   Result Value Ref Range    WBC 5.20 3.90 - 12.70 K/uL    RBC 4.67 4.60 - 6.20 M/uL    Hemoglobin 13.4 (L) 14.0 - 18.0 g/dL    Hematocrit 41.2 40.0 - 54.0 %    MCV 88 82 - 98 fL    MCH 28.7 27.0 - 31.0 pg    MCHC 32.5 32.0 - 36.0 g/dL    RDW 12.3 11.5 - 14.5 %    Platelets 230 150 - 450 K/uL    MPV 10.8 9.2 - 12.9 fL   Lipid panel    Collection Time: 12/22/21  8:42 AM   Result Value Ref Range    Cholesterol 257 (H) 120 - 199 mg/dL    Triglycerides 102 30 - 150 mg/dL    HDL 71 40 - 75 mg/dL    LDL Cholesterol 165.6 (H) 63.0 - 159.0 mg/dL    HDL/Cholesterol Ratio 27.6 20.0 - 50.0 %    Total Cholesterol/HDL Ratio 3.6 2.0 - 5.0    Non-HDL Cholesterol 186 mg/dL   TSH    Collection Time: 12/22/21  8:42 AM   Result Value Ref Range    TSH 1.937 0.400 - 4.000 uIU/mL   Hemoglobin A1c    Collection Time: 12/22/21  8:42 AM   Result Value Ref Range    Hemoglobin A1C 5.5 4.0 - 5.6 %    Estimated Avg Glucose 111 68 - 131 mg/dL   Hepatitis C Antibody    Collection Time: 12/22/21  8:42 AM   Result Value Ref Range    Hepatitis C Ab Negative  Negative          Assessment/Plan:     1) Health Maintenance updates:  - Tdap update at next Employee Health visit  - Hep C neg  - Discussed colon cancer screening. Agreed on FIT. Repeat screening 1 yr if normal.  - BP wnl  - A1c high normal. Focus on diet with sugar and carb moderation. Repeat a1c 1 yr.    2) HLD - Moderate LDL elevation with normal HDL and TG. Focus on diet with limitation of saturated fats. Repeat lipids 1 yr.    3) Onychomycosis - Notes coming back again. Would like to try course of oral terbinafine as well as topical. Start Terbinafine 250 qd x6 mths (LFT's wnl) and topical ciclopirox for 6 to 12 mths.

## 2022-07-16 ENCOUNTER — IMMUNIZATION (OUTPATIENT)
Dept: INTERNAL MEDICINE | Facility: CLINIC | Age: 47
End: 2022-07-16
Payer: COMMERCIAL

## 2022-07-16 DIAGNOSIS — Z23 NEED FOR VACCINATION: Primary | ICD-10-CM

## 2022-07-16 PROCEDURE — 91305 COVID-19, MRNA, LNP-S, PF, 30 MCG/0.3 ML DOSE VACCINE (PFIZER): CPT | Mod: PBBFAC

## 2022-10-26 ENCOUNTER — TELEPHONE (OUTPATIENT)
Dept: PULMONOLOGY | Facility: CLINIC | Age: 47
End: 2022-10-26

## 2022-10-26 DIAGNOSIS — M54.2 CHRONIC NECK PAIN: Primary | ICD-10-CM

## 2022-10-26 DIAGNOSIS — G89.29 CHRONIC NECK PAIN: Primary | ICD-10-CM

## 2022-10-28 ENCOUNTER — HOSPITAL ENCOUNTER (OUTPATIENT)
Dept: RADIOLOGY | Facility: OTHER | Age: 47
Discharge: HOME | End: 2022-10-28
Attending: INTERNAL MEDICINE
Payer: COMMERCIAL

## 2022-10-28 DIAGNOSIS — M54.2 CHRONIC NECK PAIN: ICD-10-CM

## 2022-10-28 DIAGNOSIS — G89.29 CHRONIC NECK PAIN: ICD-10-CM

## 2022-10-28 PROCEDURE — 72141 MRI CERVICAL SPINE WITHOUT CONTRAST: ICD-10-PCS | Mod: 26,,, | Performed by: RADIOLOGY

## 2022-10-28 PROCEDURE — 72141 MRI NECK SPINE W/O DYE: CPT | Mod: 26,,, | Performed by: RADIOLOGY

## 2022-10-28 PROCEDURE — 72141 MRI NECK SPINE W/O DYE: CPT | Mod: TC

## 2022-11-04 DIAGNOSIS — M54.12 CERVICAL RADICULOPATHY: Primary | ICD-10-CM

## 2022-11-14 ENCOUNTER — TELEPHONE (OUTPATIENT)
Dept: NEUROLOGY | Facility: CLINIC | Age: 47
End: 2022-11-14
Payer: COMMERCIAL

## 2022-11-16 ENCOUNTER — OFFICE VISIT (OUTPATIENT)
Dept: INTERNAL MEDICINE | Facility: CLINIC | Age: 47
End: 2022-11-16
Payer: COMMERCIAL

## 2022-11-16 VITALS
WEIGHT: 150.69 LBS | DIASTOLIC BLOOD PRESSURE: 67 MMHG | HEART RATE: 76 BPM | TEMPERATURE: 99 F | HEIGHT: 69 IN | SYSTOLIC BLOOD PRESSURE: 108 MMHG | BODY MASS INDEX: 22.32 KG/M2

## 2022-11-16 DIAGNOSIS — Z12.11 COLON CANCER SCREENING: ICD-10-CM

## 2022-11-16 DIAGNOSIS — Z00.00 ENCOUNTER FOR ANNUAL HEALTH EXAMINATION: Primary | ICD-10-CM

## 2022-11-16 PROCEDURE — 3078F DIAST BP <80 MM HG: CPT | Mod: CPTII,S$GLB,, | Performed by: INTERNAL MEDICINE

## 2022-11-16 PROCEDURE — 3008F PR BODY MASS INDEX (BMI) DOCUMENTED: ICD-10-PCS | Mod: CPTII,S$GLB,, | Performed by: INTERNAL MEDICINE

## 2022-11-16 PROCEDURE — 99999 PR PBB SHADOW E&M-EST. PATIENT-LVL III: CPT | Mod: PBBFAC,,, | Performed by: INTERNAL MEDICINE

## 2022-11-16 PROCEDURE — 99396 PR PREVENTIVE VISIT,EST,40-64: ICD-10-PCS | Mod: S$GLB,,, | Performed by: INTERNAL MEDICINE

## 2022-11-16 PROCEDURE — 99396 PREV VISIT EST AGE 40-64: CPT | Mod: S$GLB,,, | Performed by: INTERNAL MEDICINE

## 2022-11-16 PROCEDURE — 1159F PR MEDICATION LIST DOCUMENTED IN MEDICAL RECORD: ICD-10-PCS | Mod: CPTII,S$GLB,, | Performed by: INTERNAL MEDICINE

## 2022-11-16 PROCEDURE — 3008F BODY MASS INDEX DOCD: CPT | Mod: CPTII,S$GLB,, | Performed by: INTERNAL MEDICINE

## 2022-11-16 PROCEDURE — 3074F PR MOST RECENT SYSTOLIC BLOOD PRESSURE < 130 MM HG: ICD-10-PCS | Mod: CPTII,S$GLB,, | Performed by: INTERNAL MEDICINE

## 2022-11-16 PROCEDURE — 3074F SYST BP LT 130 MM HG: CPT | Mod: CPTII,S$GLB,, | Performed by: INTERNAL MEDICINE

## 2022-11-16 PROCEDURE — 1159F MED LIST DOCD IN RCRD: CPT | Mod: CPTII,S$GLB,, | Performed by: INTERNAL MEDICINE

## 2022-11-16 PROCEDURE — 3078F PR MOST RECENT DIASTOLIC BLOOD PRESSURE < 80 MM HG: ICD-10-PCS | Mod: CPTII,S$GLB,, | Performed by: INTERNAL MEDICINE

## 2022-11-16 PROCEDURE — 99999 PR PBB SHADOW E&M-EST. PATIENT-LVL III: ICD-10-PCS | Mod: PBBFAC,,, | Performed by: INTERNAL MEDICINE

## 2022-11-16 NOTE — PROGRESS NOTES
Subjective:       Patient ID: Caroline Scar Raphael MD is a 47 y.o. male.    Chief Complaint: Cervical disc herniation, Annual wellness exam    HPI  Annual health exam. Reviewed medical, surgical, social and family history, medications, appropriate preventive health screenings, as well as vaccination history. Updates as noted below or in assessment and plan.    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    Very recently seen by Dr. Erna Berry (Ortho/NS) for cervical disc herniation, noted to be acute on MRI. No obvious recent trauma. Woke with neck pain that radiates to left shoulder and axilla, associated left 1st to 3rd finger tingling. Ortho and pt agreed on steroid course, TFSI (would be aimed at left C7) if persists over coming weekend. Currently has been on a week of 20 mg prednisone daily and planning to wean now. Taking Celebrex 100 daily as well. Noticed decent relief within 1st 24 hours.    Review of Systems   All other systems reviewed and are negative.    Past Medical History:   Diagnosis Date    Chronic pain of right knee     Possible iliotibial band syndrome (normal MRI)    DJD (degenerative joint disease) of cervical spine     Hyperlipidemia     Occipital neuralgia     Onychomycosis          Current Outpatient Medications:     celecoxib (CELEBREX) 100 MG capsule, Take 1 capsule (100 mg total) by mouth once daily., Disp: 30 capsule, Rfl: 1    ciclopirox (PENLAC) 8 % Soln, Apply topically nightly., Disp: 6.6 mL, Rfl: 11    predniSONE (DELTASONE) 20 MG tablet, Take 1 tablet (20 mg total) by mouth once daily., Disp: 30 tablet, Rfl: 1    History reviewed. No pertinent surgical history.    Family History   Problem Relation Age of Onset    Multiple myeloma Mother     Heart disease Father     Colon cancer Neg Hx        Social History     Tobacco Use    Smoking status: Never    Smokeless tobacco: Never   Substance Use Topics    Alcohol use: Yes    Drug use: Never       Immunization History   Administered  Date(s) Administered    COVID-19, MRNA, LN-S, PF (Pfizer) (Gray Cap) 07/16/2022    COVID-19, MRNA, LN-S, PF (Pfizer) (Purple Cap) 12/23/2020, 01/12/2021, 09/17/2021         Objective:      Vitals:    11/16/22 1508   BP: 108/67   Pulse: 76   Temp: 98.5 °F (36.9 °C)       Physical Exam  Constitutional:       General: He is not in acute distress.     Appearance: Normal appearance. He is well-developed. He is not ill-appearing.   HENT:      Head: Normocephalic and atraumatic.      Right Ear: Hearing and tympanic membrane normal. There is no impacted cerumen.      Left Ear: Hearing and tympanic membrane normal. There is no impacted cerumen.      Nose: Nose normal.   Eyes:      Extraocular Movements: Extraocular movements intact.      Conjunctiva/sclera: Conjunctivae normal.      Pupils: Pupils are equal, round, and reactive to light.   Cardiovascular:      Rate and Rhythm: Normal rate and regular rhythm.      Heart sounds: Normal heart sounds. No murmur heard.  Pulmonary:      Effort: Pulmonary effort is normal. No respiratory distress.      Breath sounds: Normal breath sounds. No wheezing, rhonchi or rales.   Abdominal:      General: Abdomen is flat. There is no distension.   Musculoskeletal:         General: No swelling or deformity.      Cervical back: Normal range of motion. No tenderness.      Right lower leg: No edema.      Left lower leg: No edema.   Lymphadenopathy:      Cervical: No cervical adenopathy.   Skin:     General: Skin is warm and dry.      Findings: No lesion or rash.   Neurological:      General: No focal deficit present.      Mental Status: He is alert and oriented to person, place, and time.      Cranial Nerves: No cranial nerve deficit.      Coordination: Coordination normal.      Gait: Gait normal.   Psychiatric:         Mood and Affect: Mood normal.         Behavior: Behavior normal.         Thought Content: Thought content normal.         Judgment: Judgment normal.            Assessment/Plan:      1) Annual wellness exam  2) Cervical disc herniation - Improving with course of low dose prednisone and Celebrex 100 mg daily. Start weaning prednisone. Can use Celebrex bid prn. Can also add Tylenol 1000 mg QID prn if needed. If symptoms worsen or persist, he will f/u with Ortho (Dr. Erna Berry) for left C7 TFSI.  3) Hyperlipidemia - Plan for 1 yr f/u of lipids in early 2023. Has been watching diet since elevation noted last yr.    - Tetanus booster every 10 yrs.  - Discussed colon screening. Agreed on FIT testing. If normal, plan for repeat colon screening in 1 yr.  - Blood pressure normal.    Plan for annual wellness labs early 2023. Can f/u results by phone.

## 2022-11-30 ENCOUNTER — PROCEDURE VISIT (OUTPATIENT)
Dept: NEUROLOGY | Facility: CLINIC | Age: 47
End: 2022-11-30
Payer: COMMERCIAL

## 2022-11-30 DIAGNOSIS — M54.12 CERVICAL RADICULOPATHY: ICD-10-CM

## 2022-11-30 PROCEDURE — 95913 NRV CNDJ TEST 13/> STUDIES: CPT | Mod: S$GLB,,, | Performed by: PSYCHIATRY & NEUROLOGY

## 2022-11-30 PROCEDURE — 95886 PR EMG COMPLETE, W/ NERVE CONDUCTION STUDIES, 5+ MUSCLES: ICD-10-PCS | Mod: S$GLB,,, | Performed by: PSYCHIATRY & NEUROLOGY

## 2022-11-30 PROCEDURE — 95886 MUSC TEST DONE W/N TEST COMP: CPT | Mod: S$GLB,,, | Performed by: PSYCHIATRY & NEUROLOGY

## 2022-11-30 PROCEDURE — 95913 PR NERVE CONDUCTION STUDY; 13 OR MORE STUDIES: ICD-10-PCS | Mod: S$GLB,,, | Performed by: PSYCHIATRY & NEUROLOGY

## 2023-06-02 ENCOUNTER — TELEPHONE (OUTPATIENT)
Dept: REHABILITATION | Facility: OTHER | Age: 48
End: 2023-06-02
Payer: COMMERCIAL

## 2023-06-05 ENCOUNTER — CLINICAL SUPPORT (OUTPATIENT)
Dept: REHABILITATION | Facility: OTHER | Age: 48
End: 2023-06-05
Attending: PHYSICAL MEDICINE & REHABILITATION
Payer: COMMERCIAL

## 2023-06-05 ENCOUNTER — CLINICAL SUPPORT (OUTPATIENT)
Dept: REHABILITATION | Facility: OTHER | Age: 48
End: 2023-06-05
Payer: COMMERCIAL

## 2023-06-05 DIAGNOSIS — M54.2 NECK PAIN: Primary | ICD-10-CM

## 2023-06-05 DIAGNOSIS — M47.812 SPONDYLOSIS OF CERVICAL REGION WITHOUT MYELOPATHY OR RADICULOPATHY: ICD-10-CM

## 2023-06-05 PROCEDURE — 97750 PHYSICAL PERFORMANCE TEST: CPT | Mod: 32 | Performed by: PHYSICAL MEDICINE & REHABILITATION

## 2023-06-05 NOTE — PROGRESS NOTES
Health  met with patient to complete initial outcomes for the Healthy Back cervical program.  Questions were reviewed with patient and discussed with Dr. Reagan. The patient will meet with Dr. Reagan to determine program enrollment.     HealthyBack Questionnaire  6/5/2023   Please select the location of your worst pain:  Neck   Please select the location of any additional pain: (hold down the control key, and click to select multiple responses) Neck   Did the pain begin after an event, injury, or accident? No   How long has this pain been going on?  5 years   Please indicate how the pain is changing.  Worsening   What is the WORST level of pain that you have experienced in the last week?  8   What is the LEAST level of pain that you have experienced in the past week?  0   What can you NOT do not that you used to be able to do?  nothing but hinders work   Are your limitations mainly due to your pain?  Yes   What are your additional complaints, if any? (hold down the control key, and click to select multiple responses) None   Is there ever a time during the day when your pain stops, even for a brief moment, before returning? Yes   Does looking down make your typical pain worse? Yes   Morning: Worse during   Afternoon: Worse during   Evening:  Better during   Nighttime: Same   Standing:  Better   Lying on stomach: Worse   Lying on back: Same   Sitting:  Better   Walking: Better   Using a pillow: Worse   Emergency department  No   Health care providers (hold down the control key, and click to select multiple responses) Neurosurgeon   Investigations done (hold down the control key, and click to select multiple responses) X-ray, MRI   Procedures (hold down the control key, and click to select multiple responses) Epidural steroid injections (MAVERICK)   Have you had any surgery on your neck? No   Please melvin what you are experiencing. 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  Physician

## 2023-06-05 NOTE — PROGRESS NOTES
Subjective:     Patient ID: Caroline Raphael MD is a 48 y.o. male.    Chief Complaint: No chief complaint on file.     Ijeoma is a 47 yo male here  for evaluation for the healthy back cervical program.  he has had neck pain for 5-6 years from looking down in microscope.  He had increased pain last year with herniated disc but that improved. He wants to be pro active.  The pain is base of skull dominant, and a little more on left.  It is chronic tension with stabbing pain.  The pain is intermittent 0-8/10.  It is worse with strenuous activity, working out, extension, turning head to the left.  He feels better lying with neck support, heat massage and looking down. He went to PT 4 years ago with some relief.  He had an injection.  He is currently going to chiropractor 2 times a year.  He has not had surgery.  His goals are working with less pain, ROM of neck, and better sleep.  Pattern 2    EMG 11/2022  This is a normal study.    MRI cervical 11/3/2022  Alignment: Normal.     Vertebrae: Normal marrow signal. No fracture.     Discs: Normal height and signal.     Cord: Normal.     Skull base and craniocervical junction: Normal.     Degenerative findings:     C2-C3: There is no focal disc herniation.No significant central canal narrowing.  No significant neural foraminal narrowing.     C3-C4: There is no focal disc herniation.No significant central canal narrowing.  No significant neural foraminal narrowing.     C4-C5: Posterior marginal osteophytic disc spur complex effaces the anterior thecal sac margins mild in degree without cord displacementor focal alteration in cord signal intensity.  No significant central canal narrowing.  No significant neural foraminal narrowing.     C5-C6: Posterior marginal osteophytic disc spur complex effaces the anterior thecal sac margins mild in degree without cord displacementor focal alteration in cord signal intensity.  No significant central canal narrowing.  No significant neural  foraminal narrowing.     C6-C7: Posterior marginal osteophytic disc spur complex effaces the anterior thecal sac margins mild in degree without cord displacementor focal alteration in cord signal intensity.  .No significant central canal narrowing.  Mild LEFT neural foraminal narrowing.     C7-T1: There is no focal disc herniation.No significant central canal narrowing.  No significant neural foraminal narrowing.     Paraspinal muscles & soft tissues: Unremarkable.     Impression:     Mild cervical spondylosis, predominantly C4-C7 minimally progressive as compared to prior examination 2015.  Mild C6-C7 LEFT-sided neural foraminal encroachment.    Past Medical History:  No date: Chronic pain of right knee      Comment:  Possible iliotibial band syndrome (normal MRI)  No date: DJD (degenerative joint disease) of cervical spine  No date: Hyperlipidemia  No date: Occipital neuralgia  No date: Onychomycosis    No past surgical history on file.    Review of patient's family history indicates:      Social History    Socioeconomic History      Marital status:     Occupational History        Employer: [employer]    Tobacco Use      Smoking status: Never      Smokeless tobacco: Never    Substance and Sexual Activity      Alcohol use: Yes      Drug use: Never      Current Outpatient Medications:  celecoxib (CELEBREX) 100 MG capsule, Take 1 capsule (100 mg total) by mouth once daily., Disp: 30 capsule, Rfl: 1  ciclopirox (PENLAC) 8 % Soln, Apply topically nightly., Disp: 6.6 mL, Rfl: 11  predniSONE (DELTASONE) 20 MG tablet, Take 1 tablet (20 mg total) by mouth once daily., Disp: 30 tablet, Rfl: 1    No current facility-administered medications for this visit.      Review of patient's allergies indicates:   -- No known drug allergies       Review of Systems   Constitutional: Negative for weight gain and weight loss.   Cardiovascular:  Negative for chest pain.   Respiratory:  Negative for shortness of  breath.    Musculoskeletal:  Positive for neck pain. Negative for back pain, joint pain and joint swelling.   Gastrointestinal:  Negative for abdominal pain, bowel incontinence, nausea and vomiting.   Genitourinary:  Negative for bladder incontinence.   Neurological:  Negative for numbness and paresthesias.      Objective:     General: Pulin is well-developed, well-nourished, appears stated age, in no acute distress, alert and oriented to time, place and person.     General    Vitals reviewed.  Constitutional: He is oriented to person, place, and time. He appears well-developed and well-nourished.   HENT:   Head: Normocephalic and atraumatic.   Pulmonary/Chest: Effort normal.   Neurological: He is alert and oriented to person, place, and time.   Psychiatric: He has a normal mood and affect. His behavior is normal. Judgment and thought content normal.     General Musculoskeletal Exam   Gait: normal     Right Ankle/Foot Exam     Tests   Heel Walk: able to perform  Tiptoe Walk: able to perform    Left Ankle/Foot Exam     Tests   Heel Walk: able to perform  Tiptoe Walk: able to perform  Back (L-Spine & T-Spine) / Neck (C-Spine) Exam     Neck (C-Spine) Range of Motion   Flexion:      40  Extension:  40    Spinal Sensation   Right Side Sensation  C-Spine Level: normal   L-Spine Level: normal  S-Spine Level: normal  Left Side Sensation  C-Spine Level: normal  L-Spine Level: normal  S-Spine Level: normal    Back (L-Spine & T-Spine) Tests   Right Side Tests  Straight leg raise:        Sitting SLR: > 70 degrees    Left Side Tests  Straight leg raise:       Sitting SLR: > 70 degrees      Other   He has no scoliosis .  Spinal Kyphosis:  Absent      Muscle Strength   Right Upper Extremity   Biceps: 5/5   Deltoid:  5/5  Triceps:  5/5  Wrist extension: 5/5   Finger Flexors:  5/5  Left Upper Extremity  Biceps: 5/5   Deltoid:  5/5  Triceps:  5/5  Wrist extension: 5/5   Finger Flexors:  5/5  Right Lower Extremity   Hip Flexion: 5/5    Quadriceps:  5/5   Anterior tibial:  5/5   EHL:  5/5  Left Lower Extremity   Hip Flexion: 5/5   Quadriceps:  5/5   Anterior tibial:  5/5   EHL:  5/5    Reflexes     Left Side  Biceps:  2+  Triceps:  2+  Brachioradialis:  2+  Achilles:  2+  Left Romero's Sign:  Absent  Babinski Sign:  absent  Quadriceps:  2+    Right Side   Biceps:  2+  Triceps:  2+  Brachioradialis:  2+  Achilles:  2+  Right Romero's Sign:  absent  Babinski Sign:  absent  Quadriceps:  2+    Vascular Exam     Right Pulses        Carotid:                  2+    Left Pulses        Carotid:                  2+        Assessment:     1. Neck pain    2. Spondylosis of cervical region without myelopathy or radiculopathy         Plan:     Orders Placed This Encounter    Ambulatory referral/consult to [facility]     The patient has had a history of neck pain with limitations in there activities of Daily living    Previous treatment has not provided relief.    The situation was discussed at length with the patient.  We discussed different causes of neck pain and different treatment options.  We discussed the importance of stretching and strengthening.  We discussed posture.  We discussed the pros and cons of further diagnostic testing, alternative treatment and Medications    Based on the history, physical exam, and functional index, an active physical therapy program is recommended.  The goal is to restore the patients function and reduce pain.  A program of progressive resistance exercises, biomechanical, and mobility maneuvers, instructions in proper body mechanics, aerobic conditioning and HEP will be utilized. The program will continue as long as making improvements.    An assessment of patients progress will be made at each visit to document change in status.    The patient will be actively involved in there own treatment, and responsible for appointments and home program    The patient's cervical isometric strength will be tested and they  will be placed in a program of isolated strength training based on 50% of their total functional torque and advanced as clinically appropriate.      Directional preference of pain will further influence the patients active rehabilitation program    The patient was instructed there might be an initial increase in discomfort    They are enrolled in cervical program with good prognosis  Pattern 2      Follow-up: No follow-ups on file. If there are any questions prior to this, the patient was instructed to contact the office.

## 2023-06-15 ENCOUNTER — CLINICAL SUPPORT (OUTPATIENT)
Dept: REHABILITATION | Facility: HOSPITAL | Age: 48
End: 2023-06-15
Payer: COMMERCIAL

## 2023-06-15 DIAGNOSIS — M54.2 NECK PAIN: ICD-10-CM

## 2023-06-15 DIAGNOSIS — M47.812 SPONDYLOSIS OF CERVICAL REGION WITHOUT MYELOPATHY OR RADICULOPATHY: ICD-10-CM

## 2023-06-15 DIAGNOSIS — R29.898 IMPAIRED STRENGTH OF NECK MUSCLES: ICD-10-CM

## 2023-06-15 PROCEDURE — 97750 PHYSICAL PERFORMANCE TEST: CPT | Mod: 32 | Performed by: PHYSICAL MEDICINE & REHABILITATION

## 2023-06-15 NOTE — PLAN OF CARE
[facility]  Physical Therapy Cervical Evaluation      Name: Caroline Raphael MD  Clinic Number: [id number]    Therapy Diagnosis:   Encounter Diagnoses   Name Primary?    Neck pain     Spondylosis of cervical region without myelopathy or radiculopathy     Impaired strength of neck muscles      Physician: Sonal Reagan, *    Physician Orders: PT Eval and Treat    Medical Diagnosis from Referral:   M54.2 (ICD-10-CM) - Neck pain   M47.812 (ICD-10-CM) - Spondylosis of cervical region without myelopathy or radiculopathy     Evaluation Date: 6/15/2023  Authorization Period Expiration: 6/15/23  Plan of Care Expiration: 8/24/2023  Reassessment Due: 7/15/2023  Visit # / Visits authorized: 1/20    Time In: 2:30 pm  Time Out: 4:00 pm  Total Billable Time: 90 minutes  INSURANCE and OUTCOMES: Program Benefit Group with Cervical Outcomes (NDI and AQoL) 0/3 (NDI and AQoL next visit)    Precautions: standard    Pattern of pain determined: 1 REP    Subjective     Date of onset: initially neck pain started 5 years ago with recurrence and disc issues on imaging 1 year ago.  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  he has had neck pain for 5-6 years commonly triggered from looking down in microscope.  He has had ergonomic assessment with [facility] with elongation of scope for microscope which did help.  He had increased pain last year with herniated disc but that improved.  He has intermittent neck pain- triggers are operating and occasionally how he sleeps.  He wants to be pro active.  The pain is base of skull dominant, and a little more on left.  He had left arm symptoms, ulner neve distrubution 1 year ago but not in a long time. Neck  pain is intermittent 0-8/10.  It is worse with strenuous activity, working out, extension, turning head to the left.  He feels better lying with neck support, heat massage and  looking down. He went to PT 4 years ago with some relief.  He had an injection.  He is currently going to chiropractor 2 times a year.  He has not had surgery.  His goals are working with less pain, ROM of neck, and better sleep.     EMG 11/2022  This is a normal study.    MRI cervical 11/3/2022  Alignment: Normal.  Impression:   Mild cervical spondylosis, predominantly C4-C7 minimally progressive as compared to prior examination 2015.  Mild C6-C7 LEFT-sided neural foraminal encroachment.    Medical History:   Past Medical History:   Diagnosis Date    Chronic pain of right knee     Possible iliotibial band syndrome (normal MRI)    DJD (degenerative joint disease) of cervical spine     Hyperlipidemia     Occipital neuralgia     Onychomycosis        Surgical History:   Caroline Raphael MD  has no past surgical history on file.    Medications:   Caroline has a current medication list which includes the following prescription(s): celecoxib, ciclopirox, and prednisone.    Allergies:   Review of patient's allergies indicates:   Allergen Reactions    No known drug allergies         Prior Therapy: yes  Prior Treatment: injections  Social History: lives with family, 2 children 11,12  Occupation: ophthalmology Physician   Leisure: working out      Prior Level of Function: full  Current Level of Function: full  DME owned/used: nil  Gym Membership: [facility] member      Pattern of pain questions:  1.  Where is your pain the worst? neck  2.  Is your pain constant or intermittent? yes  3.  Does bending forward make your typical pain worse? intermittent  4.  Since the start of your neck pain, has there been a change in your bowel or bladder? no  5.  What can't you do now that you use to be able to do? Surgery without discomfort    Pts goals: prevention, strengthen neck    Red Flag Screening:   Cough/Sneeze Strain: (--)  Bladder/bowel: (--)  Falls: (--)  Night pain: (--)  Unexplained weight loss: (--)  General health: good    Objective       Postural examination/scapula alignment: Midline awareness  Sitting: adelaida  Standing: adelaida  Correction of posture: better with lumbar roll    Range of Motion - MOVEMENT LOSS    ROM Loss   Flexion within functional limits   Extension within functional limits   Side bending Right within functional limits   Side bending Left within functional limits   Rotation Right within functional limits   Rotation Left within functional limits   Protraction within functional limits   Retraction  minimal loss     Upper Extremity Strength  (R) UE  (L) UE    Shoulder flexion: 5/5 Shoulder flexion: 5/5   Shoulder Abduction: 5/5 Shoulder abduction: 5/5   Elbow flexion: 5/5 Elbow flexion: 5/5   Elbow extension: 5/5 Elbow extension: 5/5   Wrist flexion: 5/5 Wrist flexion: 5/5   Wrist extension: 5/5 Wrist extension: 5/5    5/5 : /5     NEUROLOGICAL SCREEN:    Sensory Deficits: intact    Special Tests:   Test Name  Testing Result   Compression (--)   Distraction (--)     Reflexes:    Left Right   Biceps  1+ 1+   Brachioradialis  1+ 1+   Clonus (--) (--)          REPEATED TEST MOVEMENTS:   Repeated Protraction in Sitting no effect   Repeated Flexion in Sitting no effect   Repeated Retraction in Sitting  no effect   Repeated Retraction Extension in Sitting no effect     Baseline Isometric Testing on Med X equipment: Testing administered by PT    Date of testin/15/23  ROM 18-90 deg   Max Peak Torque 208    Min Peak Torque 93    Flex/Ext Ratio 2/1   % below normative data -50%      [facility] 6/15/2023   Visit Number 1   VAS Pain Rating 0   Retraction in Sitting 10   Retraction with Extension 10   Cervical Extension Seat Pad 0   Seat Adjustment 272   Top Dead Center 66   Counterweight 0.5   Cervical Flexion 90   Cervical Extension 18   Cervical Peak Torque 208   Ice - Z Lie (in min.) 5      GAIT:  Assistive Device used: none  Level of Assistance: independent  Patient displays the following gait deviations: no gait  deviations observed.     Limitation/Restriction for NDI/AQOL    Therapist  scores for Caroline Scar Raphael MD on 6/15/2023. Collect visit 2    Limitation Score:  %  Visit 10 Score:   Discharge Score:   Goal Score:  %       Treatment     Treatment Time In: 3:30 pm  Treatment Time Out: 4:00 pm  Total Treatment time separate from Evaluation: 30 minutes    Caroline received therapeutic exercises to develop/improve posture, cervical ROM, strength, and muscular endurance for 5 minutes including the following exercises:     Neck retractions  Thoracic extension  Scap retraction  Watch posture  Get lumbar roll    Written Home Exercises Provided: yes.    HEP AS FOLLOWS:    Neck retractions  Thoracic extension  Scap retraction  Watch posture  Get lumbar roll    Exercises were reviewed and Caroline was able to demonstrate prior to the end of the session. Caroline demonstrated good  understanding of the education provided.     See EMR under Patient Instructions for exercises provided 6/15/2023.    Caroline received the following manual therapy techniques: nil were applied to the 0 for 0 minutes.     Caroline received neuromuscular education to engage spinal musculature correctly for motor control and engagement of musculature for 20 minutes including the MedX exercise component and practice and standard testing. MedX dynamic exercise and baseline isometric test performed with instructions to guide the patient safely through the testing procedure. Patient instructed to perform isometric test correctly and safely while building to an optimal force with a pain-free effort. Patient also instructed that he should feel support/pressure from MedX restraints but no pain/discomfort. Patient demonstrated appropriate understanding of information.     [facility] 6/15/2023   Visit Number 1   VAS Pain Rating 0   Retraction in Sitting 10   Retraction with Extension 10   Cervical Extension Seat Pad 0   Seat Adjustment 272   Top Dead Center 66    Counterweight 0.5   Cervical Flexion 90   Cervical Extension 18   Cervical Peak Torque 208   Ice - Z Lie (in min.) 5         Therapeutic Education/Activity provided for 5 minutes:   - Patient was given an [facility] Visit 1 handout which discusses the following:  - what to expect in therapy  - an overview of the program, including health coaching and wellness  - importance of spinal hygiene, proper posture, lifting mechanics, sleep quality, and nutrition/hydration   - Ez roll trialed, recommended, and purchase information was provided.  - Patient received a handout regarding anticipated muscular soreness following the isometric test and strategies for management were reviewed with patient including stretching, using ice and scheduled rest.   - Patient received verbal education on the following:   - Healthy Back program,   - purpose of the isometric test,   - safe progression of neck strengthening, wellness approach, and systemic strengthening.   - safe usage of MedX machine and testing protocols.    Caroline received cold pack for 5 minutes to neck.    Assessment     Caroline is a 48 y.o. male referred to [facility] with a medical diagnosis of   M54.2 (ICD-10-CM) - Neck pain   M47.812 (ICD-10-CM) - Spondylosis of cervical region without myelopathy or radiculopathy   . Pt presents with intermittent neck pain that is worse with performing surgery and protracted/flexed positions, and better with stretching and moving indicating directional preference of retraction/ext.  Slight reduction in neck retraction and strength 50% below normative data.  Based on the fact patient has recurrent episodes of neck pain, ergonomic challenges at work, strength deficits below normative data, and reduced retraction, he is an excellent candidate for healthy back to address strength and functional deficits and promote tools and management strategies for wellness    Pain Pattern: 1 REP       Pt prognosis is Excellent.      Pt will benefit from skilled outpatient Physical Therapy to address the deficits stated above and in the chart below, to provide pt/family education, and to maximize pt's level of independence.     Plan of care discussed with patient: Yes  Pt's spiritual, cultural and educational needs considered and patient is agreeable to the plan of care and goals as stated below:     Anticipated Barriers for therapy: scheduling with our schedules    PT Evaluation Completed? Yes    Medical necessity is demonstrated by the following problem list:      History  Co-morbidities and personal factors that may impact the plan of care [] LOW: no personal factors / co-morbidities  [x] MODERATE: 1-2 personal factors / co-morbidities  [] HIGH: 3+ personal factors / co-morbidities    Moderate / High Support Documentation:   Co-morbidities affecting plan of care: recurrent symptoms, previous disc pathology    Personal Factors:   Ergonomics at work     Examination  Body Structures and Functions, activity limitations and participation restrictions that may impact the plan of care [] LOW: addressing 1-2 elements  [x] MODERATE: 3+ elements  [] HIGH: 4+ elements (please support below)  Reduced neck retraction, posture deficits, strength below normative data by 50% and poor flex/ext ratio.  Sleep also effected at times  Moderate / High Support Documentation:     Clinical Presentation [] LOW: stable  [x] MODERATE: Evolving  [] HIGH: Unstable     Decision Making/ Complexity Score: moderate       GOALS: Pt is in agreement with the following goals.    Short term goals: 6 weeks or 10 visits   - Pt will demonstratte increased cervical ROM as measured by med ex by 3 degrees from initial test which results in improved  ROM of neck for ease with ADLs and driving. Appropriate and Ongoing  - Pt will demonstrate independence with reducing or controlling symptoms with ther ex, movement, or position independently, able to reduce pain 1-2 points on pain scale  using strategies taught in therapy.  Appropriate and Ongoing  - Pt will demonstrate increased MedX average isometric strength value by 15% with  when compared to the initial testing resulting in improved ability to perform bending, lifting, and carrying activities safely and confidently. Appropriate and Ongoing    Long term goals: 10 weeks or 20 visits  - Pt will demonstratte increased cervical ROM as measured by med ex by 8 degrees from initial test which results in functional ROM of neck for ease with ADLs and driving.  Appropriate and Ongoing  - Pt will demonstrate increased MedX average isometric strength value  by 40% from initial test to improve ability to lift and carry, and sustain good posture while performing ADL's. Appropriate and Ongoing  - Pt will demonstrate reduced pain and improved functional outcomes as reported on the Neck Disability Index by reaching a score of 5/50 or less in order to demonstrate subjective improvement in pt's condition.  Appropriate and Ongoing  - Pt will demonstrate independence with reducing or controlling symptoms with ther ex, movement, or position independently, able to reduce pain 2-4 points on pain scale using strategies taught in therapy. Appropriate and Ongoing  - Pt will demonstrate independence with the HEP at discharge. Appropriate and Ongoing  - sleep well.  Perform surgery without difficulty  Appropriate and Ongoing    Plan     Outpatient physical therapy 2x week for 10 weeks or 20 visits to include the following:   - Patient education  - Therapeutic exercise  - Manual therapy  - Performance testing   - Neuromuscular Re-education  - Therapeutic activity   - Modalities    Pt may be seen by PTA as part of the rehabilitation team.     Therapist: Kim Negron, PT  6/15/2023

## 2023-06-29 ENCOUNTER — CLINICAL SUPPORT (OUTPATIENT)
Dept: REHABILITATION | Facility: HOSPITAL | Age: 48
End: 2023-06-29
Payer: COMMERCIAL

## 2023-06-29 DIAGNOSIS — R29.898 IMPAIRED STRENGTH OF NECK MUSCLES: Primary | ICD-10-CM

## 2023-06-29 PROCEDURE — 97750 PHYSICAL PERFORMANCE TEST: CPT | Mod: 32 | Performed by: PHYSICAL MEDICINE & REHABILITATION

## 2023-06-29 NOTE — PROGRESS NOTES
[facility]      Name: Caroline Raphael MD  Clinic Number: [id number]    Therapy Diagnosis:   Encounter Diagnosis   Name Primary?    Impaired strength of neck muscles Yes     Physician: Sonal Reagan, *    Visit Date: 6/29/2023    Physician: Sonal Reagan, *     Physician Orders: PT Eval and Treat    Medical Diagnosis from Referral:   M54.2 (ICD-10-CM) - Neck pain   M47.812 (ICD-10-CM) - Spondylosis of cervical region without myelopathy or radiculopathy      Evaluation Date: 6/15/2023  Authorization Period Expiration: 6/15/23  Plan of Care Expiration: 8/24/2023  Reassessment Due: 7/15/2023  Visit # / Visits authorized: 2/20     Time In: 2:30 pm  Time Out: 3:30 pm  Total Billable Time: 60 minutes  INSURANCE and OUTCOMES: Program Benefit Group with Cervical Outcomes (NDI and AQoL) 0/3 (NDI and AQoL next visit)     Precautions: standard     Pattern of pain determined: 1 REP     Subjective    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  He attends today with neck discomfort, feeling like neck musculature is about to spasm, and headache from base of occiput to top of head.  Slight reduction in symptoms after visit.  He is compliant with HEP, and ergonomics at home.       Patient reports tolerating previous visit with some soreness after testing  Patient reports their pain to be 3/10 on a 0-10 scale with 0 being no pain and 10 being the worst pain imaginable.  Pain Location: neck     Occupation: ophthalmology Physician   Leisure: working out      Pts goals: prevention, strengthen neck, His goals are working with less pain, ROM of neck, and better sleep.     Objective       Range of Motion - MOVEMENT LOSS     ROM Loss   Flexion within functional limits   Extension within functional limits   Side bending Right within functional limits   Side bending Left within functional limits   Rotation Right within functional limits   Rotation Left within functional limits    Protraction within functional limits   Retraction  minimal loss        Baseline Isometric Testing on Med X equipment: Testing administered by PT     Date of testin/15/23  ROM 18-90 deg   Max Peak Torque 208    Min Peak Torque 93    Flex/Ext Ratio 2/1   % below normative data -50%           Outcomes:  Initial score:  NDI 8/50 or 16% impaired  Goal:  < 5/50 or < 10% impaired      Treatment    Caroline received the treatments listed below:      Caroline received neuromuscular education  to isolate and engage spinal stabilization musculature correctly for motor control and coordination to aid in function and posture for 10 minutes on the Medical Medx Machine.  Patient performed MedX dynamic exercise with emphasis on spinal muscular control using pacer throughout  active range of motion. Therapist assisted patient in achieving optimal exertion for neural reeducation and endurance training by using the  Fransico Exertion Rating scale, by instructing the patient to aim for mid range of exertion, performing 15-20 repetitions, slowly, correctly,and safely.   Additional neuromuscular activities  to improve:     [facility]23   Visit Number 2   VAS Pain Rating 3   Treadmill Time (in min.) 10   Retraction in Sitting 10   Retraction with Extension 10   Manual Therapy 10   Cervical Extension Seat Pad -   Seat Adjustment -   Top Dead Center -   Counterweight -   Cervical Flexion -   Cervical Extension -   Cervical Peak Torque -   Cervical Weight 135   Repetitions 18   Rating of Perceived Exertion 3   Ice - Z Lie (in min.) -        therapeutic exercises to develop strength, endurance, ROM, and posture for 30 minutes including:     Neck retractions 10 reps  Neck retraction ext 10 with towel for support and rotation  Thoracic extension over chair with 1/2 foam 10 reps with therapist over pressure  Open books 10 reps bilat    Peripheral muscle strengthening which included 1 set of 15-20 repetitions at a slow, controlled  10-13 second per rep pace focused on strengthening supporting musculature for improved body mechanics and functional mobility.  Pt and therapist focused on proper form during treatment to ensure optimal strengthening of each targeted muscle group.  Machines were utilized including torso rotation, leg press, hip abd and hip add, leg ext.  Leg curl, triceps, biceps, chest and row added visit 3    therapeutic activities to improve functional performance for 0  minutes, including:     manual therapy techniques: sub occipital release, traction and side bend/rotation mobs  were applied to the: 10 minutes   .     cold pack for 5 minutes to neck -deferred time    Home Exercises Provided and Patient Education Provided   Home exercises include:  Neck retractions 10 reps  Neck retraction ext 10 with towel for support and rotation  Thoracic extension over chair with 1/2 foam 10 reps with therapist over pressure  Open books 10 reps bilat  Cardio program:   Lifting education date:   Posture/Lumbar roll:    Fridge Magnet Discharge handout (date given):  Equipment at home/gym membership:        Education provided:   - protocol for healthy back progressive strengthening  -posture  -review HEP  -using tennis ball or neck roll for sub occipital release  -self traction using hands taught    Written Home Exercises Provided: Patient instructed to cont prior HEP.  Exercises were reviewed and Caroline was able to demonstrate them prior to the end of the session.  Caroline demonstrated good  understanding of the education provided.     See EMR under Patient Instructions for exercises provided prior visit.        Assessment     Patient attended his second visit for the healthy back neck protocol with some neck pain and a headache which did respond somewhat to retraction based stretching as well as sub occipital release, and traction.  We reviewed HEP and continued with strengthening protocol with Dr. Raphael tolerated neck medx of 135 in lbs, 18  reps, exertion 3.  Introduced core and LE machines which were tolerated well.  Exercise principles explained and understood.  He remains a good candidate for the program.      Patient is making good progress towards established goals.  Pt will continue to benefit from skilled outpatient physical therapy to address the deficits stated in the impairment chart, provide pt/family education and to maximize pt's level of independence in the home and community environment.     Anticipated Barriers for therapy: nil  Pt's spiritual, cultural and educational needs considered and pt agreeable to plan of care and goals as stated below:     GOALS: Pt is in agreement with the following goals.     Short term goals: 6 weeks or 10 visits   - Pt will demonstratte increased cervical ROM as measured by med ex by 3 degrees from initial test which results in improved  ROM of neck for ease with ADLs and driving. Appropriate and Ongoing  - Pt will demonstrate independence with reducing or controlling symptoms with ther ex, movement, or position independently, able to reduce pain 1-2 points on pain scale using strategies taught in therapy.  Appropriate and Ongoing  - Pt will demonstrate increased MedX average isometric strength value by 15% with  when compared to the initial testing resulting in improved ability to perform bending, lifting, and carrying activities safely and confidently. Appropriate and Ongoing     Long term goals: 10 weeks or 20 visits  - Pt will demonstratte increased cervical ROM as measured by med ex by 8 degrees from initial test which results in functional ROM of neck for ease with ADLs and driving.  Appropriate and Ongoing  - Pt will demonstrate increased MedX average isometric strength value  by 40% from initial test to improve ability to lift and carry, and sustain good posture while performing ADL's. Appropriate and Ongoing  - Pt will demonstrate reduced pain and improved functional outcomes as reported on the  Neck Disability Index by reaching a score of 5/50 or less in order to demonstrate subjective improvement in pt's condition.  Appropriate and Ongoing  - Pt will demonstrate independence with reducing or controlling symptoms with ther ex, movement, or position independently, able to reduce pain 2-4 points on pain scale using strategies taught in therapy. Appropriate and Ongoing  - Pt will demonstrate independence with the HEP at discharge. Appropriate and Ongoing  - sleep well.  Perform surgery without difficulty  Appropriate and Ongoing    Plan   Continue with established Plan of Care towards established PT goals.       Therapist: Kim Negron, PT  6/29/2023

## 2023-07-06 ENCOUNTER — CLINICAL SUPPORT (OUTPATIENT)
Dept: REHABILITATION | Facility: HOSPITAL | Age: 48
End: 2023-07-06
Payer: COMMERCIAL

## 2023-07-06 DIAGNOSIS — R29.898 IMPAIRED STRENGTH OF NECK MUSCLES: Primary | ICD-10-CM

## 2023-07-06 PROCEDURE — 97750 PHYSICAL PERFORMANCE TEST: CPT | Mod: 32 | Performed by: PHYSICAL MEDICINE & REHABILITATION

## 2023-07-06 NOTE — PROGRESS NOTES
[facility]      Name: Caroline Raphael MD  Clinic Number: [id number]    Therapy Diagnosis:   Encounter Diagnosis   Name Primary?    Impaired strength of neck muscles Yes       Physician: Sonal Reagan, *    Visit Date: 7/6/2023    Physician: Sonal Reagan, *     Physician Orders: PT Eval and Treat    Medical Diagnosis from Referral:   M54.2 (ICD-10-CM) - Neck pain   M47.812 (ICD-10-CM) - Spondylosis of cervical region without myelopathy or radiculopathy      Evaluation Date: 6/15/2023  Authorization Period Expiration: 6/15/23  Plan of Care Expiration: 8/24/2023  Reassessment Due:  8/6/23  Visit # / Visits authorized: 3/20     Time In: 2:30 pm  Time Out: 3:30 pm  Total Billable Time: 60 minutes  INSURANCE and OUTCOMES: Program Benefit Group with Cervical Outcomes (NDI and AQoL) 0/3 (NDI and AQoL next visit)     Precautions: standard     Pattern of pain determined: 1 REP     Subjective    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  No real pain today.  He is compliant with HEP, and ergonomics at home.       Patient reports tolerating previous visit with some soreness after testing  Patient reports their pain to be 0/10 on a 0-10 scale with 0 being no pain and 10 being the worst pain imaginable.  Pain Location: neck     Occupation: ophthalmology Physician   Leisure: working out      Pts goals: prevention, strengthen neck, His goals are working with less pain, ROM of neck, and better sleep.     Objective       Range of Motion - MOVEMENT LOSS     ROM Loss   Flexion within functional limits   Extension within functional limits   Side bending Right within functional limits   Side bending Left within functional limits   Rotation Right within functional limits   Rotation Left within functional limits   Protraction within functional limits   Retraction  minimal loss        Baseline Isometric Testing on Med X equipment: Testing administered by PT     Date of  testin/15/23  ROM 18-90 deg   Max Peak Torque 208    Min Peak Torque 93    Flex/Ext Ratio 2/1   % below normative data -50%           Outcomes:  Initial score:  NDI 8/50 or 16% impaired  Goal:  < 5/50 or < 10% impaired      Treatment    Caroline received the treatments listed below:      Caroline received neuromuscular education  to isolate and engage spinal stabilization musculature correctly for motor control and coordination to aid in function and posture for 10 minutes on the Medical Medx Machine.  Patient performed MedX dynamic exercise with emphasis on spinal muscular control using pacer throughout  active range of motion. Therapist assisted patient in achieving optimal exertion for neural reeducation and endurance training by using the  Fransico Exertion Rating scale, by instructing the patient to aim for mid range of exertion, performing 15-20 repetitions, slowly, correctly,and safely.   Additional neuromuscular activities  to improve:     [facility] 2023   Visit Number 3   VAS Pain Rating 0   Treadmill Time (in min.) 7   Retraction in Sitting 10   Retraction with Extension 10   Manual Therapy -   Cervical Extension Seat Pad -   Seat Adjustment -   Top Dead Center -   Counterweight -   Cervical Flexion -   Cervical Extension -   Cervical Peak Torque -   Cervical Weight 135   Repetitions 20   Rating of Perceived Exertion 4   Ice - Z Lie (in min.) 5      therapeutic exercises to develop strength, endurance, ROM, and posture for 30 minutes including:     Neck retractions 10 reps  Neck retraction ext 10 with towel for support and rotation  Thoracic extension over chair with 1/2 foam 10 reps with therapist over pressure  Trap stretch 20 sec bilat  Lev scap stretch 20 sec bilat  Open books 10 reps bilat  Supine lie fl[place] roller pec stretch 1 min  Prone lie mid trap and lower trap engagement 5 sec 10 reps  Prone lie mid trap and lower trap engagement with arm extension 5 sec 10 reps        Peripheral muscle  strengthening which included 1 set of 15-20 repetitions at a slow, controlled 10-13 second per rep pace focused on strengthening supporting musculature for improved body mechanics and functional mobility.  Pt and therapist focused on proper form during treatment to ensure optimal strengthening of each targeted muscle group.  Machines were utilized including torso rotation, leg press, hip abd and hip add, leg ext.  Leg curl, triceps, biceps, chest and row added visit 3    therapeutic activities to improve functional performance for 0  minutes, including:     manual therapy techniques: sub occipital release, traction and side bend/rotation mobs  were applied to the: 10 minutes   .     cold pack for 5 minutes to neck -deferred time    Home Exercises Provided and Patient Education Provided   Home exercises include:  Neck retractions 10 reps  Neck retraction ext 10 with towel for support and rotation  Thoracic extension over chair with 1/2 foam 10 reps with therapist over pressure  Open books 10 reps bilat  Cardio program:   Lifting education date:   Posture/Lumbar roll:    Fridge Magnet Discharge handout (date given):  Equipment at home/gym membership:        Education provided:   - protocol for healthy back progressive strengthening  -posture  -review HEP  -using tennis ball or neck roll for sub occipital release    Written Home Exercises Provided: Patient instructed to cont prior HEP.  Exercises were reviewed and Caroline was able to demonstrate them prior to the end of the session.  Pulin demonstrated good  understanding of the education provided.     See EMR under Patient Instructions for exercises provided prior visit.        Assessment     Patient attended his third  visit for the healthy back neck protocol. We reviewed HEP and continued with strengthening protocol.  Progressed stretching to include upper traps and lev stretching.  Progressed program to include mid traps and lower traps work.  Continued MedX  with  Dr. Raphael tolerating neck medx of 135 in lbs, 20 reps, exertion 3.  Continued  core and LE machines and introduced UE machines which were tolerated well.  Exercise principles explained and understood.  He remains a good candidate for the program.      Patient is making good progress towards established goals.  Pt will continue to benefit from skilled outpatient physical therapy to address the deficits stated in the impairment chart, provide pt/family education and to maximize pt's level of independence in the home and community environment.     Anticipated Barriers for therapy: nil  Pt's spiritual, cultural and educational needs considered and pt agreeable to plan of care and goals as stated below:     GOALS: Pt is in agreement with the following goals.     Short term goals: 6 weeks or 10 visits   - Pt will demonstratte increased cervical ROM as measured by med ex by 3 degrees from initial test which results in improved  ROM of neck for ease with ADLs and driving. Appropriate and Ongoing  - Pt will demonstrate independence with reducing or controlling symptoms with ther ex, movement, or position independently, able to reduce pain 1-2 points on pain scale using strategies taught in therapy.  Appropriate and Ongoing  - Pt will demonstrate increased MedX average isometric strength value by 15% with  when compared to the initial testing resulting in improved ability to perform bending, lifting, and carrying activities safely and confidently. Appropriate and Ongoing     Long term goals: 10 weeks or 20 visits  - Pt will demonstratte increased cervical ROM as measured by med ex by 8 degrees from initial test which results in functional ROM of neck for ease with ADLs and driving.  Appropriate and Ongoing  - Pt will demonstrate increased MedX average isometric strength value  by 40% from initial test to improve ability to lift and carry, and sustain good posture while performing ADL's. Appropriate and Ongoing  - Pt will  demonstrate reduced pain and improved functional outcomes as reported on the Neck Disability Index by reaching a score of 5/50 or less in order to demonstrate subjective improvement in pt's condition.  Appropriate and Ongoing  - Pt will demonstrate independence with reducing or controlling symptoms with ther ex, movement, or position independently, able to reduce pain 2-4 points on pain scale using strategies taught in therapy. Appropriate and Ongoing  - Pt will demonstrate independence with the HEP at discharge. Appropriate and Ongoing  - sleep well.  Perform surgery without difficulty  Appropriate and Ongoing    Plan   Continue with established Plan of Care towards established PT goals.       Therapist: Kim Negron, PT  7/6/2023

## 2023-07-13 ENCOUNTER — CLINICAL SUPPORT (OUTPATIENT)
Dept: REHABILITATION | Facility: HOSPITAL | Age: 48
End: 2023-07-13
Payer: COMMERCIAL

## 2023-07-13 DIAGNOSIS — R29.898 IMPAIRED STRENGTH OF NECK MUSCLES: Primary | ICD-10-CM

## 2023-07-13 PROCEDURE — 97750 PHYSICAL PERFORMANCE TEST: CPT | Mod: 32 | Performed by: PHYSICAL MEDICINE & REHABILITATION

## 2023-07-13 NOTE — PATIENT INSTRUCTIONS
Thoracic Rotation - Open Book                  Shimmy forward so shoulders are off bed.  Lower head and then raise

## 2023-07-13 NOTE — PROGRESS NOTES
[facility]      Name: Caroline Raphael MD  Clinic Number: [id number]    Therapy Diagnosis:   Encounter Diagnosis   Name Primary?    Impaired strength of neck muscles Yes         Physician: Sonal Reagan, *    Visit Date: 7/13/2023    Physician: Sonal Reagan, *     Physician Orders: PT Eval and Treat    Medical Diagnosis from Referral:   M54.2 (ICD-10-CM) - Neck pain   M47.812 (ICD-10-CM) - Spondylosis of cervical region without myelopathy or radiculopathy      Evaluation Date: 6/15/2023  Authorization Period Expiration: 6/15/23  Plan of Care Expiration: 8/24/2023  Reassessment Due:  8/13/23  Visit # / Visits authorized: 4/20     Time In: 2:30 pm  Time Out: 3:30 pm  Total Billable Time: 60 minutes  INSURANCE and OUTCOMES: Program Benefit Group with Cervical Outcomes (NDI and AQoL) 0/3 (NDI and AQoL next visit)     Precautions: standard     Pattern of pain determined: 1 REP     Subjective    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  No pain today.   He is compliant with HEP, and ergonomics at home.       Patient reports tolerating previous visit with some soreness after testing  Patient reports their pain to be 0/10 on a 0-10 scale with 0 being no pain and 10 being the worst pain imaginable.  Pain Location: neck     Occupation: ophthalmology Physician   Leisure: working out      Pts goals: prevention, strengthen neck, His goals are working with less pain, ROM of neck, and better sleep.     Objective       Range of Motion - MOVEMENT LOSS     ROM Loss 7/13/23   Flexion within functional limits   Extension within functional limits   Side bending Right within functional limits   Side bending Left within functional limits   Rotation Right within functional limits   Rotation Left within functional limits   Protraction within functional limits   Retraction  minimal loss        Baseline Isometric Testing on Med X equipment: Testing administered by PT     Date of testing:  6/15/23  ROM 18-90 deg   Max Peak Torque 208    Min Peak Torque 93    Flex/Ext Ratio 2/1   % below normative data -50%           Outcomes:  Initial score:  NDI 8/50 or 16% impaired  Goal:  < 5/50 or < 10% impaired      Treatment    Caroline received the treatments listed below:      Caroline received neuromuscular education  to isolate and engage spinal stabilization musculature correctly for motor control and coordination to aid in function and posture for 10 minutes on the Medical Medx Machine.  Patient performed MedX dynamic exercise with emphasis on spinal muscular control using pacer throughout  active range of motion. Therapist assisted patient in achieving optimal exertion for neural reeducation and endurance training by using the  Fransico Exertion Rating scale, by instructing the patient to aim for mid range of exertion, performing 15-20 repetitions, slowly, correctly,and safely.   Additional neuromuscular activities  to improve:       [facility] 7/13/2023   Visit Number 4   VAS Pain Rating 0   Treadmill Time (in min.) 7   Retraction in Sitting 10   Retraction with Extension 10   Sidebending 2   Rotation 2   Scapular Retraction 10   Manual Therapy -   Cervical Extension Seat Pad -   Seat Adjustment -   Top Dead Center -   Counterweight -   Cervical Flexion -   Cervical Extension -   Cervical Peak Torque -   Cervical Weight 141   Repetitions 18   Rating of Perceived Exertion 4   Ice - Z Lie (in min.) 5         therapeutic exercises to develop strength, endurance, ROM, and posture for 30 minutes including:     Neck retractions 10 reps  Neck retraction ext 10 with towel for support and rotation  Thoracic extension over chair with 1/2 foam 10 reps with therapist over pressure  Trap stretch 20 sec bilat  Lev scap stretch 20 sec bilat  Open books 10 reps bilat  Prone lie mid trap and lower trap engagement 5 sec 10 reps  I's T's, Y's, and W's prone 10 reps with mid traps setting 10 reps  No money green theraband 20  reps    Prone neck extensions off mat edge for strengthening   Supine neck and T spine ext off mat edge for stretching    Peripheral muscle strengthening which included 1 set of 15-20 repetitions at a slow, controlled 10-13 second per rep pace focused on strengthening supporting musculature for improved body mechanics and functional mobility.  Pt and therapist focused on proper form during treatment to ensure optimal strengthening of each targeted muscle group.  Machines were utilized including torso rotation, leg press, hip abd and hip add, leg ext.  Leg curl, triceps, biceps, chest and row added visit 3    therapeutic activities to improve functional performance for 0  minutes, including:     manual therapy techniques: sub occipital release, traction and side bend/rotation mobs  were applied to the neck, nil 7/13/23   .     cold pack for 5 minutes to neck -    Home Exercises Provided and Patient Education Provided   Home exercises include:  Neck retractions 10 reps  Neck retraction ext 10 with towel for support and rotation  Thoracic extension over chair with 1/2 foam 10 reps with therapist over pressure  Open books 10 reps bilat  Supine thoracic stretch over bed edge  No money's with green band  Prone neck raises using gravity for resistance 10 reps  Cardio program:   Lifting education date:   Posture/Lumbar roll:    Fridge Magnet Discharge handout (date given):  Equipment at home/gym membership:        Education provided:   - review HEP - new handouts made      Written Home Exercises Provided: Patient instructed to cont prior HEP with additions of no money, and prone head raises  Exercises were reviewed and Caroline was able to demonstrate them prior to the end of the session.  Caroline demonstrated good  understanding of the education provided.     See EMR under Patient Instructions for exercises provided 7/13/23        Assessment     Patient attended visit 4 of  the healthy back neck protocol. We reviewed HEP and  "continued with strengthening protocol.  Progressed thoracic and scapular strengthening to include I's, T's, Y's, and Ws.   Continued MedX  with Dr. Raphael tolerating neck medx of 141 in lbs, 18 reps, exertion 3.  Continued  core and LE machines and introduced UE machines which were tolerated well.  Exercise principles explained and understood.  Progressed HEP to include "no money's and prone neck strengthening, and supine neck stretching off bed edge."   He remains a good candidate for the program.      Patient is making good progress towards established goals.  Pt will continue to benefit from skilled outpatient physical therapy to address the deficits stated in the impairment chart, provide pt/family education and to maximize pt's level of independence in the home and community environment.     Anticipated Barriers for therapy: nil  Pt's spiritual, cultural and educational needs considered and pt agreeable to plan of care and goals as stated below:     GOALS: Pt is in agreement with the following goals.     Short term goals: 6 weeks or 10 visits   - Pt will demonstratte increased cervical ROM as measured by med ex by 3 degrees from initial test which results in improved  ROM of neck for ease with ADLs and driving. Appropriate and Ongoing  - Pt will demonstrate independence with reducing or controlling symptoms with ther ex, movement, or position independently, able to reduce pain 1-2 points on pain scale using strategies taught in therapy.  Appropriate and Ongoing  - Pt will demonstrate increased MedX average isometric strength value by 15% with  when compared to the initial testing resulting in improved ability to perform bending, lifting, and carrying activities safely and confidently. Appropriate and Ongoing     Long term goals: 10 weeks or 20 visits  - Pt will demonstratte increased cervical ROM as measured by med ex by 8 degrees from initial test which results in functional ROM of neck for ease with ADLs and " driving.  Appropriate and Ongoing  - Pt will demonstrate increased MedX average isometric strength value  by 40% from initial test to improve ability to lift and carry, and sustain good posture while performing ADL's. Appropriate and Ongoing  - Pt will demonstrate reduced pain and improved functional outcomes as reported on the Neck Disability Index by reaching a score of 5/50 or less in order to demonstrate subjective improvement in pt's condition.  Appropriate and Ongoing  - Pt will demonstrate independence with reducing or controlling symptoms with ther ex, movement, or position independently, able to reduce pain 2-4 points on pain scale using strategies taught in therapy. Appropriate and Ongoing  - Pt will demonstrate independence with the HEP at discharge. Appropriate and Ongoing  - sleep well.  Perform surgery without difficulty  Appropriate and Ongoing    Plan   Continue with established Plan of Care towards established PT goals.       Therapist: Kim Negron, PT  7/13/2023

## 2023-08-17 ENCOUNTER — CLINICAL SUPPORT (OUTPATIENT)
Dept: REHABILITATION | Facility: HOSPITAL | Age: 48
End: 2023-08-17
Payer: COMMERCIAL

## 2023-08-17 DIAGNOSIS — R29.898 IMPAIRED STRENGTH OF NECK MUSCLES: Primary | ICD-10-CM

## 2023-08-17 PROCEDURE — 97750 PHYSICAL PERFORMANCE TEST: CPT | Mod: 32 | Performed by: PHYSICAL MEDICINE & REHABILITATION

## 2023-08-17 NOTE — PROGRESS NOTES
[facility]      Name: Caroline Raphael MD  Clinic Number: [id number]    Therapy Diagnosis:   Encounter Diagnosis   Name Primary?    Impaired strength of neck muscles Yes           Physician: Sonal Reagan, *    Visit Date: 8/17/2023    Physician: Sonal Reagan, *     Physician Orders: PT Eval and Treat    Medical Diagnosis from Referral:   M54.2 (ICD-10-CM) - Neck pain   M47.812 (ICD-10-CM) - Spondylosis of cervical region without myelopathy or radiculopathy      Evaluation Date: 6/15/2023  Authorization Period Expiration: 6/15/23  Plan of Care Expiration: 8/24/2023 sent through 10/17/23  Reassessment Due:  9/17/23  Visit # / Visits authorized: 5/20     Time In: 2:30 pm  Time Out: 3:30 pm  Total Billable Time: 60 minutes  INSURANCE and OUTCOMES: Program Benefit Group with Cervical Outcomes (NDI and AQoL) 0/3 (NDI and AQoL next visit)     Precautions: standard     Pattern of pain determined: 1 REP     Subjective    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  No pain today.   He is compliant with HEP, and ergonomics at home.   He doesn't have symptoms daily, but has neck pain with certain positions, surgery, and other activities that put his neck on strain.  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    Patient reports tolerating previous visit with some soreness after testing  Patient reports their pain to be 0/10 on a 0-10 scale with 0 being no pain and 10 being the worst pain imaginable.  Pain Location: neck     Occupation: ophthalmology Physician   Leisure: working out      Pts goals: prevention, strengthen neck, His goals are working with less pain, ROM of neck, and better sleep.     Objective       Range of Motion - MOVEMENT LOSS     ROM Loss 8/17/23   Flexion within functional limits   Extension within functional limits   Side bending Right within functional limits   Side  bending Left within functional limits   Rotation Right within functional limits   Rotation Left within functional limits   Protraction within functional limits   Retraction  No loss, functional        Baseline Isometric Testing on Med X equipment: Testing administered by PT     Date of testin/15/23  ROM 18-90 deg   Max Peak Torque 208    Min Peak Torque 93    Flex/Ext Ratio 2/1   % below normative data -50%           Outcomes:  Initial score:  NDI 8/50 or 16% impaired  Goal:  < 5/50 or < 10% impaired      Treatment    Caroline received the treatments listed below:      Caroline received neuromuscular education  to isolate and engage spinal stabilization musculature correctly for motor control and coordination to aid in function and posture for 10 minutes on the Medical Medx Machine.  Patient performed MedX dynamic exercise with emphasis on spinal muscular control using pacer throughout  active range of motion. Therapist assisted patient in achieving optimal exertion for neural reeducation and endurance training by using the  Fransico Exertion Rating scale, by instructing the patient to aim for mid range of exertion, performing 15-20 repetitions, slowly, correctly,and safely.       [facility] 2023   Visit Number 5   VAS Pain Rating 0   Treadmill Time (in min.) -   Retraction in Sitting 10   Retraction with Extension 10   Sidebending 2   Rotation 2   Scapular Retraction 10   Manual Therapy -   Cervical Extension Seat Pad -   Seat Adjustment -   Top Dead Center -   Counterweight -   Cervical Flexion -   Cervical Extension -   Cervical Peak Torque -   Cervical Weight 141   Repetitions 18   Rating of Perceived Exertion 4   Ice - Z Lie (in min.) 5              therapeutic exercises to develop strength, endurance, ROM, and posture for 30 minutes including:     Neck retractions 10 reps  Neck retraction ext 10 with towel for support and rotation  Thoracic extension over chair with 1/2 foam 10 reps with therapist over  pressure  Trap stretch 20 sec bilat  Lev scap stretch 20 sec bilat  Open books 10 reps bilat  Prone lie mid trap and lower trap engagement 5 sec 10 reps  I's T's, Y's, and W's prone 10 reps with mid traps setting 10 reps with 2 lbs  No money blue theraband 20 reps    Prone neck extensions off mat edge for strengthening   Supine neck and T spine ext off mat edge for stretching    Peripheral muscle strengthening which included 1 set of 15-20 repetitions at a slow, controlled 10-13 second per rep pace focused on strengthening supporting musculature for improved body mechanics and functional mobility.  Pt and therapist focused on proper form during treatment to ensure optimal strengthening of each targeted muscle group.  Machines were utilized including torso rotation, leg press, hip abd and hip add, leg ext.  Leg curl, triceps, biceps, chest and row added visit 3    therapeutic activities to improve functional performance for 0  minutes, including:     manual therapy techniques: PA's mid thoracic spine for mobility    .     cold pack for 5 minutes to neck -    Home Exercises Provided and Patient Education Provided   Home exercises include:  Neck retractions 10 reps  Neck retraction ext 10 with towel for support and rotation  Thoracic extension over chair with 1/2 foam 10 reps with therapist over pressure  Open books 10 reps bilat  Supine thoracic stretch over bed edge  No money's with green band  Prone neck raises using gravity for resistance 10 reps  Cardio program: he tries to run  Lifting education date:   Posture/Lumbar roll:  yes  Fridge Magnet Discharge handout (date given):  Equipment at home/gym membership:        Education provided:   - review posture and stretches      Written Home Exercises Provided: Patient instructed to cont prior HEP with additions of no money, and prone head raises  Exercises were reviewed and Caroline was able to demonstrate them prior to the end of the session.  Caroline demonstrated good   understanding of the education provided.     See EMR under Patient Instructions for exercises provided 7/13/23        Assessment     Patient attended visit 5 of  the healthy back neck protocol with 1 month absence due to our scheduling difficulties as well as his travel.   He reports he is about the same, but is using stretches to help with symptoms after surgery or other activities which are slightly prone to cause symptoms with some success.   Neck mobility within functional limits and patient demonstrates good ability to perform.  Returned to program principles of neck,  thoracic and scapular strengthening.    Continued MedX  with Dr. Raphael tolerating neck medx of 141 in lbs, 18 reps, exertion 3.  Continued  core and LE machines and introduced UE machines which were tolerated well.  Exercise principles explained and understood. He remains a good candidate for the program.      Patient is making good progress towards established goals.  Pt will continue to benefit from skilled outpatient physical therapy to address the deficits stated in the impairment chart, provide pt/family education and to maximize pt's level of independence in the home and community environment.     Anticipated Barriers for therapy: nil  Pt's spiritual, cultural and educational needs considered and pt agreeable to plan of care and goals as stated below:     GOALS: Pt is in agreement with the following goals.     Short term goals: 6 weeks or 10 visits   - Pt will demonstratte increased cervical ROM as measured by med ex by 3 degrees from initial test which results in improved  ROM of neck for ease with ADLs and driving. Appropriate and Ongoing  - Pt will demonstrate independence with reducing or controlling symptoms with ther ex, movement, or position independently, able to reduce pain 1-2 points on pain scale using strategies taught in therapy.  Appropriate and Ongoing  - Pt will demonstrate increased MedX average isometric strength value by  15% with  when compared to the initial testing resulting in improved ability to perform bending, lifting, and carrying activities safely and confidently. Appropriate and Ongoing     Long term goals: 10 weeks or 20 visits  - Pt will demonstratte increased cervical ROM as measured by med ex by 8 degrees from initial test which results in functional ROM of neck for ease with ADLs and driving.  Appropriate and Ongoing  - Pt will demonstrate increased MedX average isometric strength value  by 40% from initial test to improve ability to lift and carry, and sustain good posture while performing ADL's. Appropriate and Ongoing  - Pt will demonstrate reduced pain and improved functional outcomes as reported on the Neck Disability Index by reaching a score of 5/50 or less in order to demonstrate subjective improvement in pt's condition.  Appropriate and Ongoing  - Pt will demonstrate independence with reducing or controlling symptoms with ther ex, movement, or position independently, able to reduce pain 2-4 points on pain scale using strategies taught in therapy. Appropriate and Ongoing  - Pt will demonstrate independence with the HEP at discharge. Appropriate and Ongoing  - sleep well.  Perform surgery without difficulty  Appropriate and Ongoing    Plan   Continue with established Plan of Care towards established PT goals.       Therapist: Kim Negron, PT  8/17/2023

## 2023-08-17 NOTE — PLAN OF CARE
[facility]  Physical Therapy Plan of Care Note     Name: Caroline Raphael MD  Clinic Number: [id number]    Therapy Diagnosis:   Encounter Diagnosis   Name Primary?    Impaired strength of neck muscles Yes           Physician: Sonal Reagan, *    Visit Date: 8/17/2023    Physician: Sonal Reagan, *     Physician Orders: PT Eval and Treat    Medical Diagnosis from Referral:   M54.2 (ICD-10-CM) - Neck pain   M47.812 (ICD-10-CM) - Spondylosis of cervical region without myelopathy or radiculopathy      Evaluation Date: 6/15/2023  Authorization Period Expiration: 6/15/23  Plan of Care Expiration: 8/24/2023 sent through 10/17/23  Reassessment Due:  9/17/23  Visit # / Visits authorized: 5/20     Time In: 2:30 pm  Time Out: 3:30 pm  Total Billable Time: 60 minutes  INSURANCE and OUTCOMES: Program Benefit Group with Cervical Outcomes (NDI and AQoL) 0/3 (NDI and AQoL next visit)     Precautions: standard     Pattern of pain determined: 1 REP     Subjective    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  No pain today.   He is compliant with HEP, and ergonomics at home.   He doesn't have symptoms daily, but has neck pain with certain positions, surgery, and other activities that put his neck on strain.  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    Pts goals: prevention, strengthen neck, His goals are working with less pain, ROM of neck, and better sleep.     Objective       Range of Motion - MOVEMENT LOSS     ROM Loss 8/17/23   Flexion within functional limits   Extension within functional limits   Side bending Right within functional limits   Side bending Left within functional limits   Rotation Right within functional limits   Rotation Left within functional limits   Protraction within functional limits   Retraction  No loss, functional        Baseline Isometric Testing on Med X equipment:  Testing administered by PT     Date of testin/15/23  ROM 18-90 deg   Max Peak Torque 208    Min Peak Torque 93    Flex/Ext Ratio 2/1   % below normative data -50%           Outcomes:  Initial score:  NDI 8/50 or 16% impaired  Goal:  < 5/50 or < 10% impaired  Assessment     Patient attended visit 5 of  the healthy back neck protocol with 1 month absence due to our scheduling difficulties as well as his travel.   He reports he is about the same, but is using stretches to help with symptoms after surgery or other activities which are slightly prone to cause symptoms with some success.   Neck mobility within functional limits and patient demonstrates good ability to perform.  Returned to program principles of neck,  thoracic and scapular strengthening.    Continued MedX  with Dr. Raphael tolerating neck medx of 141 in lbs, 18 reps, exertion 3.  Continued  core and LE machines and introduced UE machines which were tolerated well.  Exercise principles explained and understood. He remains a good candidate for the program.      Patient is making good progress towards established goals.  Pt will continue to benefit from skilled outpatient physical therapy to address the deficits stated in the impairment chart, provide pt/family education and to maximize pt's level of independence in the home and community environment.     Anticipated Barriers for therapy: nil  Pt's spiritual, cultural and educational needs considered and pt agreeable to plan of care and goals as stated below:     GOALS: Pt is in agreement with the following goals.     Short term goals: 6 weeks or 10 visits   - Pt will demonstratte increased cervical ROM as measured by med ex by 3 degrees from initial test which results in improved  ROM of neck for ease with ADLs and driving. Appropriate and Ongoing  - Pt will demonstrate independence with reducing or controlling symptoms with ther ex, movement, or position independently, able to reduce pain 1-2 points on  pain scale using strategies taught in therapy.  Appropriate and Ongoing  - Pt will demonstrate increased MedX average isometric strength value by 15% with  when compared to the initial testing resulting in improved ability to perform bending, lifting, and carrying activities safely and confidently. Appropriate and Ongoing     Long term goals: 10 weeks or 20 visits  - Pt will demonstratte increased cervical ROM as measured by med ex by 8 degrees from initial test which results in functional ROM of neck for ease with ADLs and driving.  Appropriate and Ongoing  - Pt will demonstrate increased MedX average isometric strength value  by 40% from initial test to improve ability to lift and carry, and sustain good posture while performing ADL's. Appropriate and Ongoing  - Pt will demonstrate reduced pain and improved functional outcomes as reported on the Neck Disability Index by reaching a score of 5/50 or less in order to demonstrate subjective improvement in pt's condition.  Appropriate and Ongoing  - Pt will demonstrate independence with reducing or controlling symptoms with ther ex, movement, or position independently, able to reduce pain 2-4 points on pain scale using strategies taught in therapy. Appropriate and Ongoing  - Pt will demonstrate independence with the HEP at discharge. Appropriate and Ongoing  - sleep well.  Perform surgery without difficulty  Appropriate and Ongoing      PLAN     Updated Certification Period: 8/17/23 to 10/17/23   Recommended Treatment Plan: 2/week healthy back for 20 visits    Kim Negron, PT

## 2023-08-31 ENCOUNTER — CLINICAL SUPPORT (OUTPATIENT)
Dept: REHABILITATION | Facility: HOSPITAL | Age: 48
End: 2023-08-31
Payer: COMMERCIAL

## 2023-08-31 DIAGNOSIS — R29.898 IMPAIRED STRENGTH OF NECK MUSCLES: Primary | ICD-10-CM

## 2023-08-31 PROCEDURE — 97750 PHYSICAL PERFORMANCE TEST: CPT | Mod: 32 | Performed by: PHYSICAL MEDICINE & REHABILITATION

## 2023-08-31 NOTE — PROGRESS NOTES
[facility]      Name: Caroline Raphael MD  Clinic Number: [id number]    Therapy Diagnosis:   Encounter Diagnosis   Name Primary?    Impaired strength of neck muscles Yes           Physician: Sonal Reagan, *    Visit Date: 8/31/2023    Physician: Sonal Reagan, *     Physician Orders: PT Eval and Treat    Medical Diagnosis from Referral:   M54.2 (ICD-10-CM) - Neck pain   M47.812 (ICD-10-CM) - Spondylosis of cervical region without myelopathy or radiculopathy      Evaluation Date: 6/15/2023  Authorization Period Expiration: 6/15/23  Plan of Care Expiration: 8/24/2023 sent through 10/17/23  Reassessment Due:  9/30/23  Visit # / Visits authorized: 6/20     Time In: 2:30 pm  Time Out: 3:30 pm  Total Billable Time: 60 minutes  INSURANCE and OUTCOMES: Program Benefit Group with Cervical Outcomes (NDI and AQoL)      Precautions: standard     Pattern of pain determined: 1 REP     Subjective    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  No pain today.  Intermittent pain after long days of patient care, especially surgery, but stretches help.   He is compliant with HEP, and ergonomics at home.      Patient reports tolerating previous visit well.    Patient reports their pain to be 0/10 on a 0-10 scale with 0 being no pain and 10 being the worst pain imaginable.  Pain Location: neck     Occupation: ophthalmology Physician   Leisure: working out      Pts goals: prevention, strengthen neck, His goals are working with less pain, ROM of neck, and better sleep.     Objective       Range of Motion - MOVEMENT LOSS     ROM Loss 8/31/23   Flexion within functional limits   Extension within functional limits   Side bending Right within functional limits   Side bending Left within functional limits   Rotation Right within functional limits   Rotation Left within functional limits   Protraction within functional limits   Retraction  No loss, functional        Baseline Isometric Testing  on Med X equipment: Testing administered by PT     Date of testin/15/23  ROM 18-90 deg   Max Peak Torque 208    Min Peak Torque 93    Flex/Ext Ratio 2/1   % below normative data -50%           Outcomes:  Initial score:  NDI 8/50 or 16% impaired  Goal:  < 5/50 or < 10% impaired      Treatment    Caroline received the treatments listed below:      Caroline received neuromuscular education  to isolate and engage spinal stabilization musculature correctly for motor control and coordination to aid in function and posture for 10 minutes on the Medical Medx Machine.  Patient performed MedX dynamic exercise with emphasis on spinal muscular control using pacer throughout  active range of motion. Therapist assisted patient in achieving optimal exertion for neural reeducation and endurance training by using the  Fransico Exertion Rating scale, by instructing the patient to aim for mid range of exertion, performing 15-20 repetitions, slowly, correctly,and safely.           2023     4:50 PM   [facility]   Visit Number 6   VAS Pain Rating 0   Treadmill Time (in min.) 7 min   Retraction in Sitting 10   Retraction with Extension 10   Sidebending 2   Rotation 2   Scapular Retraction 10   Cervical Flexion 99   Cervical Extension 18   Cervical Weight 141 lbs   Repetitions 20   Rating of Perceived Exertion 3   Ice - Z Lie (in min.) 5          therapeutic exercises to develop strength, endurance, ROM, and posture for 30 minutes including:     Neck retractions 10 reps  Neck retraction ext 10 with towel for support and rotation  Thoracic extension over chair with 1/2 foam 10 reps with therapist over pressure  Trap stretch 20 sec bilat  Lev scap stretch 20 sec bilat  Open books 10 reps bilat  Prone neck extensions off mat edge for strengthening   Supine neck and T spine ext off mat edge for stretching    Progressed scapular work to:  Supine bridge with green theraband horizontal abduction 15 reps  Bird dog, UE lifts only green  theraband 10 reps bilat UE raise  Bird dog, opposite arm and leg lift with rows with 8 lb weight 10 reps bilat  Wall walks with red theraband with UE     Peripheral muscle strengthening which included 1 set of 15-20 repetitions at a slow, controlled 10-13 second per rep pace focused on strengthening supporting musculature for improved body mechanics and functional mobility.  Pt and therapist focused on proper form during treatment to ensure optimal strengthening of each targeted muscle group.  Machines were utilized including torso rotation, leg press, hip abd and hip add, leg ext.  Leg curl, triceps, biceps, chest and row added visit 3    therapeutic activities to improve functional performance for 0  minutes, including:     manual therapy techniques: PA's mid thoracic spine for mobility    .     cold pack for 5 minutes to neck -    Home Exercises Provided and Patient Education Provided   Home exercises include:  Neck retractions 10 reps  Neck retraction ext 10 with towel for support and rotation  Thoracic extension over chair with 1/2 foam 10 reps with therapist over pressure  Open books 10 reps bilat  Supine thoracic stretch over bed edge  No money's with green band  Prone neck raises using gravity for resistance 10 reps  Cardio program: he tries to run  Lifting education date:   Posture/Lumbar roll:  yes  Fridge [employer] Discharge handout (date given):  Equipment at home/gym membership:        Education provided:   - review posture and stretches      Written Home Exercises Provided: Patient instructed to cont prior HEP with additions of no money, and prone head raises  Exercises were reviewed and Caroline was able to demonstrate them prior to the end of the session.  Caroline demonstrated good  understanding of the education provided.     See EMR under Patient Instructions for exercises provided 7/13/23        Assessment     Patient attended visit 6 of  the healthy back neck protocol with intermittent neck symptoms  after days of long patient care or surgery and no symptoms today.  Symptoms do respond to stretching. He is using stretches to help with symptoms after surgery or other activities which are slightly prone to cause symptoms with some success.   Neck mobility within functional limits and patient demonstrates good ability to perform and noted improved ability to go to 99 degrees on medx neck, as well as past 18 degrees extension.  Dr. Raphael tolerating neck medx of 141 in lbs, 20 reps, exertion 3 at the new range of 18-99.  Continued  core and LE machines and  UE machines which were tolerated well.  Exercise principles explained and understood. He remains a good candidate for the program.      Patient is making good progress towards established goals.  Pt will continue to benefit from skilled outpatient physical therapy to address the deficits stated in the impairment chart, provide pt/family education and to maximize pt's level of independence in the home and community environment.     Anticipated Barriers for therapy: nil  Pt's spiritual, cultural and educational needs considered and pt agreeable to plan of care and goals as stated below:     GOALS: Pt is in agreement with the following goals.     Short term goals: 6 weeks or 10 visits   - Pt will demonstratte increased cervical ROM as measured by med ex by 3 degrees from initial test which results in improved  ROM of neck for ease with ADLs and driving. MET 8/31/23  - Pt will demonstrate independence with reducing or controlling symptoms with ther ex, movement, or position independently, able to reduce pain 1-2 points on pain scale using strategies taught in therapy.  Appropriate and Ongoing  - Pt will demonstrate increased MedX average isometric strength value by 15% with  when compared to the initial testing resulting in improved ability to perform bending, lifting, and carrying activities safely and confidently. Appropriate and Ongoing     Long term goals: 10 weeks  or 20 visits  - Pt will demonstratte increased cervical ROM as measured by med ex by 8 degrees from initial test which results in functional ROM of neck for ease with ADLs and driving. MET 8/31/23  - Pt will demonstrate increased MedX average isometric strength value  by 40% from initial test to improve ability to lift and carry, and sustain good posture while performing ADL's. Appropriate and Ongoing  - Pt will demonstrate reduced pain and improved functional outcomes as reported on the Neck Disability Index by reaching a score of 5/50 or less in order to demonstrate subjective improvement in pt's condition.  Appropriate and Ongoing  - Pt will demonstrate independence with reducing or controlling symptoms with ther ex, movement, or position independently, able to reduce pain 2-4 points on pain scale using strategies taught in therapy. Appropriate and Ongoing  - Pt will demonstrate independence with the HEP at discharge. Appropriate and Ongoing  - sleep well.  Perform surgery without difficulty  Appropriate and Ongoing    Plan   Continue with established Plan of Care towards established PT goals.       Therapist: Kim Negron, PT  8/31/2023

## 2023-09-14 ENCOUNTER — CLINICAL SUPPORT (OUTPATIENT)
Dept: REHABILITATION | Facility: HOSPITAL | Age: 48
End: 2023-09-14
Payer: COMMERCIAL

## 2023-09-14 DIAGNOSIS — R29.898 IMPAIRED STRENGTH OF NECK MUSCLES: Primary | ICD-10-CM

## 2023-09-14 PROCEDURE — 97750 PHYSICAL PERFORMANCE TEST: CPT | Mod: 32 | Performed by: PHYSICAL MEDICINE & REHABILITATION

## 2023-09-14 NOTE — PROGRESS NOTES
[facility]      Name: Caroline Raphael MD  Clinic Number: [id number]    Therapy Diagnosis:   Encounter Diagnosis   Name Primary?    Impaired strength of neck muscles Yes           Physician: Sonal Reagan, *    Visit Date: 9/14/2023    Physician: Sonal Reagan, *     Physician Orders: PT Eval and Treat    Medical Diagnosis from Referral:   M54.2 (ICD-10-CM) - Neck pain   M47.812 (ICD-10-CM) - Spondylosis of cervical region without myelopathy or radiculopathy      Evaluation Date: 6/15/2023  Authorization Period Expiration: 6/15/23  Plan of Care Expiration: 8/24/2023 sent through 10/17/23  Reassessment Due:  9/30/23  Visit # / Visits authorized: 7/20     Time In: 2:30 pm  Time Out: 3:30 pm  Total Billable Time: 60 minutes  INSURANCE and OUTCOMES: Program Benefit Group with Cervical Outcomes (NDI and AQoL)      Precautions: standard     Pattern of pain determined: 1 REP     Subjective    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  No pain today.  Intermittent pain after long days of patient care, especially surgery.  He had a day last week that was very challenging in surgery all day with neck pain after,  but stretches help.   He is compliant with HEP, and ergonomics at home.  Discussed tennis ball release on days he has tension as well.    Patient reports tolerating previous visit well.    Patient reports their pain to be 0/10 on a 0-10 scale with 0 being no pain and 10 being the worst pain imaginable.  Pain Location: neck     Occupation: ophthalmology Physician   Leisure: working out      Pts goals: prevention, strengthen neck, His goals are working with less pain, ROM of neck, and better sleep.     Objective       Range of Motion - MOVEMENT LOSS     ROM Loss 8/31/23   Flexion within functional limits   Extension within functional limits   Side bending Right within functional limits   Side bending Left within functional limits   Rotation Right within functional  limits   Rotation Left within functional limits   Protraction within functional limits   Retraction  No loss, functional        Baseline Isometric Testing on Med X equipment: Testing administered by PT     Date of testin/15/23  ROM 18-90 deg   Max Peak Torque 208    Min Peak Torque 93    Flex/Ext Ratio 2/1   % below normative data -50%           Outcomes:  Initial score:  NDI 8/50 or 16% impaired  Goal:  < 5/50 or < 10% impaired      Treatment    Caroline received the treatments listed below:      Caroline received neuromuscular education  to isolate and engage spinal stabilization musculature correctly for motor control and coordination to aid in function and posture for 10 minutes on the Medical Medx Machine.  Patient performed MedX dynamic exercise with emphasis on spinal muscular control using pacer throughout  active range of motion. Therapist assisted patient in achieving optimal exertion for neural reeducation and endurance training by using the  Fransico Exertion Rating scale, by instructing the patient to aim for mid range of exertion, performing 15-20 repetitions, slowly, correctly,and safely.               2023     4:02 PM   [facility]   Visit Number 7   VAS Pain Rating 0   Treadmill Time (in min.) 7 min   Retraction in Sitting 10   Retraction with Extension 10   Sidebending 2   Rotation 2   Scapular Retraction 10   Manual Therapy 10 mins.   Cervical Weight 147 lbs   Repetitions 16   Rating of Perceived Exertion 4   Ice - Z Lie (in min.) 5           therapeutic exercises to develop strength, endurance, ROM, and posture for 30 minutes including:     Neck retractions 10 reps  Neck retraction ext 10 with towel for support and rotation  Thoracic extension over chair with 1/2 foam 10 reps with therapist over pressure  Trap stretch 20 sec bilat  Lev scap stretch 20 sec bilat  Open books 10 reps bilat  Prone neck extensions off mat edge for strengthening   Supine neck and T spine ext off mat edge for  stretching    Patient had sore knee from walking into equipment at work today, so deferred kneeling activities and strengthening for thoracic and mid back consisted of:  -squat row with 2 plates 15 reps inspire with bar  -lifts 2 plates 15 reps inspire bilat inspire with handles  -standing over head row 2 plates 15 reps inspire with handles    NP:  Supine bridge with green theraband horizontal abduction 15 reps  Bird dog, UE lifts only green theraband 10 reps bilat UE raise  Bird dog, opposite arm and leg lift with rows with 8 lb weight 10 reps bilat    Peripheral muscle strengthening which included 1 set of 15-20 repetitions at a slow, controlled 10-13 second per rep pace focused on strengthening supporting musculature for improved body mechanics and functional mobility.  Pt and therapist focused on proper form during treatment to ensure optimal strengthening of each targeted muscle group.  Machines were utilized including torso rotation, leg press, hip abd and hip add, leg ext.  Leg curl, triceps, biceps, chest and row added visit 3    therapeutic activities to improve functional performance for 0  minutes, including:     manual therapy techniques: sub occipital release, strain counter strain trigger point right traps, thoracic mobs     .     cold pack for 5 minutes to neck -    Home Exercises Provided and Patient Education Provided   Home exercises include:  Neck retractions 10 reps  Neck retraction ext 10 with towel for support and rotation  Thoracic extension over chair with 1/2 foam 10 reps with therapist over pressure  Open books 10 reps bilat  Supine thoracic stretch over bed edge  No money's with green band  Prone neck raises using gravity for resistance 10 reps  Cardio program: he tries to run  Lifting education date:   Posture/Lumbar roll:  yes  Fridge [employer] Discharge handout (date given):  Equipment at home/gym membership:        Education provided:   - review posture and stretches      Written Home  Exercises Provided: Patient instructed to cont prior HEP with additions of no money, and prone head raises  Exercises were reviewed and Caroline was able to demonstrate them prior to the end of the session.  Caroline demonstrated good  understanding of the education provided.     See EMR under Patient Instructions for exercises provided 7/13/23        Assessment     Patient attended visit 7 of  the healthy back neck protocol with intermittent neck symptoms after days of long patient care or surgery and no symptoms today.  Symptoms do respond to stretching. He is using stretches to help with symptoms after surgery or other activities which are slightly prone to cause symptoms with some success.   Neck mobility within functional limits and patient demonstrates good ability to perform and noted improved ability to go to 99 degrees on medx neck, as well as past 18 degrees extension.  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   Continued  core and LE machines and  UE machines which were tolerated well.  Exercise principles explained and understood. He remains a good candidate for the program.      Patient is making good progress towards established goals.  Pt will continue to benefit from skilled outpatient physical therapy to address the deficits stated in the impairment chart, provide pt/family education and to maximize pt's level of independence in the home and community environment.     Anticipated Barriers for therapy: nil  Pt's spiritual, cultural and educational needs considered and pt agreeable to plan of care and goals as stated below:     GOALS: Pt is in agreement with the following goals.     Short term goals: 6 weeks or 10 visits   - Pt will demonstratte increased cervical ROM as measured by med ex by 3 degrees from initial test which results in improved  ROM of neck for ease with ADLs and driving. MET 8/31/23  - Pt will demonstrate  independence with reducing or controlling symptoms with ther ex, movement, or position independently, able to reduce pain 1-2 points on pain scale using strategies taught in therapy.  Appropriate and Ongoing  - Pt will demonstrate increased MedX average isometric strength value by 15% with  when compared to the initial testing resulting in improved ability to perform bending, lifting, and carrying activities safely and confidently. Appropriate and Ongoing     Long term goals: 10 weeks or 20 visits  - Pt will demonstratte increased cervical ROM as measured by med ex by 8 degrees from initial test which results in functional ROM of neck for ease with ADLs and driving. MET 8/31/23  - Pt will demonstrate increased MedX average isometric strength value  by 40% from initial test to improve ability to lift and carry, and sustain good posture while performing ADL's. Appropriate and Ongoing  - Pt will demonstrate reduced pain and improved functional outcomes as reported on the Neck Disability Index by reaching a score of 5/50 or less in order to demonstrate subjective improvement in pt's condition.  Appropriate and Ongoing  - Pt will demonstrate independence with reducing or controlling symptoms with ther ex, movement, or position independently, able to reduce pain 2-4 points on pain scale using strategies taught in therapy. Appropriate and Ongoing  - Pt will demonstrate independence with the HEP at discharge. Appropriate and Ongoing  - sleep well.  Perform surgery without difficulty  Appropriate and Ongoing    Plan   Continue with established Plan of Care towards established PT goals.       Therapist: Kim Negron, PT  9/14/2023

## 2023-09-18 ENCOUNTER — CLINICAL SUPPORT (OUTPATIENT)
Dept: REHABILITATION | Facility: HOSPITAL | Age: 48
End: 2023-09-18
Payer: COMMERCIAL

## 2023-09-18 DIAGNOSIS — R29.898 IMPAIRED STRENGTH OF NECK MUSCLES: Primary | ICD-10-CM

## 2023-09-18 PROCEDURE — 97750 PHYSICAL PERFORMANCE TEST: CPT | Mod: 32

## 2023-09-18 NOTE — PROGRESS NOTES
[facility]      Name: Caroline Raphael MD  Clinic Number: [id number]    Therapy Diagnosis:   Encounter Diagnosis   Name Primary?    Impaired strength of neck muscles Yes           Physician: Sonal Reagan, *    Visit Date: 9/18/2023    Physician: Sonal Reagan, *     Physician Orders: PT Eval and Treat    Medical Diagnosis from Referral:   M54.2 (ICD-10-CM) - Neck pain   M47.812 (ICD-10-CM) - Spondylosis of cervical region without myelopathy or radiculopathy      Evaluation Date: 6/15/2023  Authorization Period Expiration: 6/15/23  Plan of Care Expiration: 8/24/2023 sent through 10/17/23  Reassessment Due:  9/30/23  Visit # / Visits authorized: 8/20     Time In: 2:45 pm  Time Out: 3:50 pm  Total Billable Time: 60 minutes  INSURANCE and OUTCOMES: Program Benefit Group with Cervical Outcomes (NDI and AQoL)      Precautions: standard     Pattern of pain determined: 1 REP     Subjective    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  Very mild pain today.  He reports some muscular pain after last session which moves into base of head, he did find some relief with trigger point release. He is compliant with HEP, and ergonomics at home.      Patient reports their pain to be 1/10 on a 0-10 scale with 0 being no pain and 10 being the worst pain imaginable.  Pain Location: neck     Occupation: ophthalmology Physician   Leisure: working out      Pts goals: prevention, strengthen neck, His goals are working with less pain, ROM of neck, and better sleep.     Objective       Range of Motion - MOVEMENT LOSS     ROM Loss 8/31/23   Flexion within functional limits   Extension within functional limits   Side bending Right within functional limits   Side bending Left within functional limits   Rotation Right within functional limits   Rotation Left within functional limits   Protraction within functional limits   Retraction  No loss, functional        Baseline Isometric Testing on  Med X equipment: Testing administered by PT     Date of testin/15/23  ROM 18-90 deg   Max Peak Torque 208    Min Peak Torque 93    Flex/Ext Ratio 2/1   % below normative data -50%           Outcomes:  Initial score:  NDI 8/50 or 16% impaired  Goal:  < 5/50 or < 10% impaired      Treatment    Caroline received the treatments listed below:      Caroline received neuromuscular education  to isolate and engage spinal stabilization musculature correctly for motor control and coordination to aid in function and posture for 10 minutes on the Medical Medx Machine.  Patient performed MedX dynamic exercise with emphasis on spinal muscular control using pacer throughout  active range of motion. Therapist assisted patient in achieving optimal exertion for neural reeducation and endurance training by using the  Fransico Exertion Rating scale, by instructing the patient to aim for mid range of exertion, performing 15-20 repetitions, slowly, correctly,and safely.           2023     3:19 PM   [facility] - Short   Visit Number 8   VAS Pain Rating 1   Treadmill Time (in min.) 0 min   Retraction in Sitting 10   Retraction with Extension 10   Sidebending 2   Rotation 2   Scapular Retraction 10   Manual Therapy 10 mins.   Cervical Weight 147 lbs   Repetitions 18   Rating of Perceived Exertion 4        therapeutic exercises to develop strength, endurance, ROM, and posture for 35 minutes including:     Neck retractions 10 reps  Neck retraction ext 10 with towel for support and rotation  Thoracic extension over chair with 1/2 foam 10 reps with therapist over pressure  Trap stretch 20 sec bilat  Lev scap stretch 20 sec bilat  Open books 10 reps bilat  Prone neck extensions off mat edge for strengthening x 15  Supine neck and T spine ext off mat edge for stretching   squat row with 2 plates 15 reps inspire with bar  -lifts 2 plates 15 reps inspire bilat inspire with handles  standing over head row 2 plates 15 reps inspire with  handles   Bird dog, opposite arm and leg lift with rows with 8 lb weight 10 reps bilat    NP:  Supine bridge with green theraband horizontal abduction 15 reps  Bird dog, UE lifts only green theraband 10 reps bilat UE raise    Peripheral muscle strengthening which included 1 set of 15-20 repetitions at a slow, controlled 10-13 second per rep pace focused on strengthening supporting musculature for improved body mechanics and functional mobility.  Pt and therapist focused on proper form during treatment to ensure optimal strengthening of each targeted muscle group.  Machines were utilized including torso rotation, leg press, hip abd and hip add, leg ext.  Leg curl, triceps, biceps, chest and row added visit 3    therapeutic activities to improve functional performance for 0  minutes, including:     manual therapy techniques: sub occipital release, strain counter strain trigger point right traps, thoracic mobs   at END of session x 10 min    cold pack for 5 minutes to neck     Home Exercises Provided and Patient Education Provided   Home exercises include:  Neck retractions 10 reps  Neck retraction ext 10 with towel for support and rotation  Thoracic extension over chair with 1/2 foam 10 reps with therapist over pressure  Open books 10 reps bilat  Supine thoracic stretch over bed edge  No money's with green band  Prone neck raises using gravity for resistance 10 reps  Cardio program: he tries to run  Lifting education date:   Posture/Lumbar roll:  yes  Fridge [employer] Discharge handout (date given):  Equipment at home/gym membership:        Education provided:   - review posture and stretches      Written Home Exercises Provided: Patient instructed to cont prior HEP with additions of no money, and prone head raises  Exercises were reviewed and Caroline was able to demonstrate them prior to the end of the session.  Caroline demonstrated good  understanding of the education provided.     See EMR under Patient Instructions for  exercises provided 7/13/23        Assessment     Patient attended visit 8 of  the healthy back neck protocol with very mild symptoms.  Neck discomfort is increased after days of long patient care or surgery. Symptoms do respond to stretching. He is using stretches to help with symptoms after surgery or other activities which are slightly prone to cause symptoms with some success.   Neck mobility within functional limits and patient demonstrates good ability to perform and noted improved ability to go to 99 degrees on medx neck, as well as past 18 degrees extension.  Dr. Raphael tolerating neck medx of 147 in lbs, 18 reps, exertion  4  He is able to extend past 18 degrees with ease.   Continued  core and LE machines and  UE machines which were tolerated well.  Exercise principles explained and understood. He remains a good candidate for the program.      Patient is making good progress towards established goals.  Pt will continue to benefit from skilled outpatient physical therapy to address the deficits stated in the impairment chart, provide pt/family education and to maximize pt's level of independence in the home and community environment.     Anticipated Barriers for therapy: nil  Pt's spiritual, cultural and educational needs considered and pt agreeable to plan of care and goals as stated below:     GOALS: Pt is in agreement with the following goals.     Short term goals: 6 weeks or 10 visits   - Pt will demonstratte increased cervical ROM as measured by med ex by 3 degrees from initial test which results in improved  ROM of neck for ease with ADLs and driving. MET 8/31/23  - Pt will demonstrate independence with reducing or controlling symptoms with ther ex, movement, or position independently, able to reduce pain 1-2 points on pain scale using strategies taught in therapy.  Appropriate and Ongoing  - Pt will demonstrate increased MedX average isometric strength value by 15% with  when compared to the initial  testing resulting in improved ability to perform bending, lifting, and carrying activities safely and confidently. Appropriate and Ongoing     Long term goals: 10 weeks or 20 visits  - Pt will demonstratte increased cervical ROM as measured by med ex by 8 degrees from initial test which results in functional ROM of neck for ease with ADLs and driving. MET 8/31/23  - Pt will demonstrate increased MedX average isometric strength value  by 40% from initial test to improve ability to lift and carry, and sustain good posture while performing ADL's. Appropriate and Ongoing  - Pt will demonstrate reduced pain and improved functional outcomes as reported on the Neck Disability Index by reaching a score of 5/50 or less in order to demonstrate subjective improvement in pt's condition.  Appropriate and Ongoing  - Pt will demonstrate independence with reducing or controlling symptoms with ther ex, movement, or position independently, able to reduce pain 2-4 points on pain scale using strategies taught in therapy. Appropriate and Ongoing  - Pt will demonstrate independence with the HEP at discharge. Appropriate and Ongoing  - sleep well.  Perform surgery without difficulty  Appropriate and Ongoing    Plan   Continue with established Plan of Care towards established PT goals.       Therapist: Albertina Hastings, PT  9/18/2023

## 2023-09-21 ENCOUNTER — CLINICAL SUPPORT (OUTPATIENT)
Dept: REHABILITATION | Facility: HOSPITAL | Age: 48
End: 2023-09-21
Payer: COMMERCIAL

## 2023-09-21 DIAGNOSIS — R29.898 IMPAIRED STRENGTH OF NECK MUSCLES: Primary | ICD-10-CM

## 2023-09-21 PROCEDURE — 97750 PHYSICAL PERFORMANCE TEST: CPT | Mod: 32 | Performed by: PHYSICAL MEDICINE & REHABILITATION

## 2023-09-21 NOTE — PROGRESS NOTES
[facility]      Name: Caroline Raphael MD  Clinic Number: [id number]    Therapy Diagnosis:   Encounter Diagnosis   Name Primary?    Impaired strength of neck muscles Yes           Physician: Sonal Reagan, *    Visit Date: 9/21/2023    Physician: Sonal Reagan, *     Physician Orders: PT Eval and Treat    Medical Diagnosis from Referral:   M54.2 (ICD-10-CM) - Neck pain   M47.812 (ICD-10-CM) - Spondylosis of cervical region without myelopathy or radiculopathy      Evaluation Date: 6/15/2023  Authorization Period Expiration: 6/15/23  Plan of Care Expiration: 8/24/2023 sent through 10/17/23  Reassessment Due:  10/21/23  Visit # / Visits authorized: 9/20  (MedX testing done visit 9)     Time In: 2:30 pm  Time Out: 3:40 pm  Total Billable Time: 60 minutes  INSURANCE and OUTCOMES: Program Benefit Group with Cervical Outcomes (NDI and AQoL)      Precautions: standard     Pattern of pain determined: 1 REP     Subjective    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   We did the manual therapy after the visit last time and he found that felt better.  He is compliant with HEP, and ergonomics at home.      Patient reports their pain to be 1/10 on a 0-10 scale with 0 being no pain and 10 being the worst pain imaginable.  Pain Location: neck     Occupation: ophthalmology Physician   Leisure: working out      Pts goals: prevention, strengthen neck, His goals are working with less pain, ROM of neck, and better sleep.     Objective       Range of Motion - MOVEMENT LOSS     ROM Loss 9/21/23   Flexion within functional limits   Extension within functional limits   Side bending Right within functional limits   Side bending Left within functional limits   Rotation Right within functional limits   Rotation Left within functional limits   Protraction within functional limits   Retraction  No loss, functional        Baseline Isometric Testing on Med X equipment: Testing administered by  PT     Date of testin/15/23  ROM 18-90 deg   Max Peak Torque 208    Min Peak Torque 93    Flex/Ext Ratio 2/1   % below normative data -50%         Midpoint Isometric Testing on Med X equipment: Testing administered by PT     Date of testin23  ROM  deg   Max Peak Torque 293   Min Peak Torque 200   Flex/Ext Ratio 2/1   % below normative data -25% with 62% gain in strength              Outcomes:  Initial score:  NDI 8/50 or 16% impaired  Visit 9 NDI 5/50 or 10%   Goal:  < 5/50 or < 10% impaired      Treatment    Caroline received the treatments listed below:      Caroline received neuromuscular education  to isolate and engage spinal stabilization musculature correctly for motor control and coordination to aid in function and posture for 10 minutes on the Medical Medx Machine.  Patient performed MedX dynamic exercise with emphasis on spinal muscular control using pacer throughout  active range of motion. Therapist assisted patient in achieving optimal exertion for neural reeducation and endurance training by using the  Fransico Exertion Rating scale, by instructing the patient to aim for mid range of exertion, performing 15-20 repetitions, slowly, correctly,and safely.             2023     4:03 PM   [facility]   Visit Number 9   VAS Pain Rating 0   Treadmill Time (in min.) 8 min   Retraction in Sitting 10   Retraction with Extension 10   Sidebending 2   Rotation 2   Scapular Retraction 10   Manual Therapy 10 mins.   Cervical Flexion 108   Cervical Extension 18   Cervical Peak Torque 293 ft. lbs.   Ice - Z Lie (in min.) 5           therapeutic exercises to develop strength, endurance, ROM, and posture for 35 minutes including:     Neck retractions 10 reps  Neck retraction ext 10 with towel for support and rotation  Thoracic extension over chair with 1/2 foam 10 reps with therapist over pressure  Trap stretch 20 sec bilat  Lev scap stretch 20 sec bilat  Open books 10 reps bilat  standing over head  row 2 plates 15 reps inspire with handles   Bird dog, opposite arm and leg lift with rows with 8 lb weight 10 reps bilat    NP:  Prone neck extensions off mat edge for strengthening x 15 -NP  Supine neck and T spine ext off mat edge for stretching -NP  squat row with 2 plates 15 reps inspire with bar -NP  -lifts 2 plates 15 reps inspire bilat inspire with handles  -NP  Supine bridge with green theraband horizontal abduction 15 reps      Peripheral muscle strengthening which included 1 set of 15-20 repetitions at a slow, controlled 10-13 second per rep pace focused on strengthening supporting musculature for improved body mechanics and functional mobility.  Pt and therapist focused on proper form during treatment to ensure optimal strengthening of each targeted muscle group.  Machines were utilized including torso rotation, leg press, hip abd and hip add, leg ext.  Leg curl, triceps, biceps, chest and row added visit 3    therapeutic activities to improve functional performance for 0  minutes, including:     manual therapy techniques: sub occipital release, strain counter strain trigger point right traps, thoracic mobs   at END of session x 6 min    cold pack for 5 minutes to neck     Home Exercises Provided and Patient Education Provided   Home exercises include:  Neck retractions 10 reps  Neck retraction ext 10 with towel for support and rotation  Thoracic extension over chair with 1/2 foam 10 reps with therapist over pressure  Open books 10 reps bilat  Supine thoracic stretch over bed edge  No money's with green band  Prone neck raises using gravity for resistance 10 reps  Cardio program: he tries to run when he has time  Lifting education date:   Posture/Lumbar roll:  yes  Fridge [employer] Discharge handout (date given):  Equipment at home/gym membership:  belongs to gym      Education provided:   - review posture and stretches, testing results from medx test  -safe procedure testing with breathing in and breathing  out with push, building through safe comfortable effort      Written Home Exercises Provided: Patient instructed to cont prior HEP with additions of no money, and prone head raises  Exercises were reviewed and Caroline was able to demonstrate them prior to the end of the session.  Caroline demonstrated good  understanding of the education provided.     See EMR under Patient Instructions for exercises provided 7/13/23        Assessment     Patient has attended 9 visits at [facility] which included MD evaluation, PT evaluation with isometric testing, and physical therapy treatment including HEP instruction, education, aerobic activity, dynamic strengthening on MedX equipment for the spine, and whole body strengthening on MedX equipment with increasing resistance. Patient demonstrates increased ability to reduce symptoms, improve posture, improve ROM, and improve strength, as stated below:    -Improved posture, he is using lumbar roll  -Improved cervical  ROM, initially on MedX test 18-90 and currently .  -Improved strength at each test point on lumbar MedX isometric test with 62%   average improvement noted with reduced pain noted by patient.  -Initial outcome tool score 16% impaired  and current outcome tool score 10% impaired  indicating reduced pain and improved function.       Patient is making good progress towards established goals.  Pt will continue to benefit from skilled outpatient physical therapy to address the deficits stated in the impairment chart, provide pt/family education and to maximize pt's level of independence in the home and community environment.     Anticipated Barriers for therapy: nil  Pt's spiritual, cultural and educational needs considered and pt agreeable to plan of care and goals as stated below:     GOALS: Pt is in agreement with the following goals.     Short term goals: 6 weeks or 10 visits   - Pt will demonstratte increased cervical ROM as measured by med ex by 3 degrees  from initial test which results in improved  ROM of neck for ease with ADLs and driving. MET 8/31/23  - Pt will demonstrate independence with reducing or controlling symptoms with ther ex, movement, or position independently, able to reduce pain 1-2 points on pain scale using strategies taught in therapy.  (MET 9/21/23)  - Pt will demonstrate increased MedX average isometric strength value by 15% with  when compared to the initial testing resulting in improved ability to perform bending, lifting, and carrying activities safely and confidently.  (MET 9/21/23)  Long term goals: 10 weeks or 20 visits  - Pt will demonstratte increased cervical ROM as measured by med ex by 8 degrees from initial test which results in functional ROM of neck for ease with ADLs and driving. MET 8/31/23  - Pt will demonstrate increased MedX average isometric strength value  by 40% from initial test to improve ability to lift and carry, and sustain good posture while performing ADL's.  (MET 9/21/23)  - Pt will demonstrate reduced pain and improved functional outcomes as reported on the Neck Disability Index by reaching a score of 5/50 or less in order to demonstrate subjective improvement in pt's condition.   (MET 9/21/23)  - Pt will demonstrate independence with reducing or controlling symptoms with ther ex, movement, or position independently, able to reduce pain 2-4 points on pain scale using strategies taught in therapy. Appropriate and Ongoing  - Pt will demonstrate independence with the HEP at discharge. Appropriate and Ongoing  - sleep well.  Perform surgery without difficulty  Appropriate and Ongoing    Plan   Continue with established Plan of Care towards established PT goals.       Therapist: Kim Negron, PT  9/21/2023

## 2023-10-04 ENCOUNTER — PATIENT MESSAGE (OUTPATIENT)
Dept: ADMINISTRATIVE | Facility: HOSPITAL | Age: 48
End: 2023-10-04
Payer: COMMERCIAL

## 2023-10-04 ENCOUNTER — OFFICE VISIT (OUTPATIENT)
Dept: INTERNAL MEDICINE | Facility: CLINIC | Age: 48
End: 2023-10-04
Payer: COMMERCIAL

## 2023-10-04 ENCOUNTER — CLINICAL SUPPORT (OUTPATIENT)
Dept: INTERNAL MEDICINE | Facility: CLINIC | Age: 48
End: 2023-10-04
Payer: COMMERCIAL

## 2023-10-04 VITALS
SYSTOLIC BLOOD PRESSURE: 118 MMHG | OXYGEN SATURATION: 98 % | HEART RATE: 73 BPM | BODY MASS INDEX: 22.34 KG/M2 | DIASTOLIC BLOOD PRESSURE: 86 MMHG | WEIGHT: 150.81 LBS | HEIGHT: 69 IN

## 2023-10-04 DIAGNOSIS — Z00.00 HEALTH MAINTENANCE EXAMINATION: ICD-10-CM

## 2023-10-04 DIAGNOSIS — Z00.00 ANNUAL PHYSICAL EXAM: Primary | ICD-10-CM

## 2023-10-04 DIAGNOSIS — Z13.6 ENCOUNTER FOR SCREENING FOR CARDIOVASCULAR DISORDERS: ICD-10-CM

## 2023-10-04 DIAGNOSIS — Z12.11 COLON CANCER SCREENING: ICD-10-CM

## 2023-10-04 DIAGNOSIS — F40.243 FLYING PHOBIA: ICD-10-CM

## 2023-10-04 DIAGNOSIS — E78.5 DYSLIPIDEMIA: ICD-10-CM

## 2023-10-04 DIAGNOSIS — Z23 NEED FOR VACCINATION: ICD-10-CM

## 2023-10-04 DIAGNOSIS — B35.1 ONYCHOMYCOSIS: ICD-10-CM

## 2023-10-04 LAB
ALBUMIN SERPL BCP-MCNC: 4.6 G/DL (ref 3.5–5.2)
ALP SERPL-CCNC: 59 U/L (ref 55–135)
ALT SERPL W/O P-5'-P-CCNC: 21 U/L (ref 10–44)
ANION GAP SERPL CALC-SCNC: 7 MMOL/L (ref 8–16)
AST SERPL-CCNC: 22 U/L (ref 10–40)
BILIRUB SERPL-MCNC: 0.6 MG/DL (ref 0.1–1)
BUN SERPL-MCNC: 20 MG/DL (ref 6–20)
CALCIUM SERPL-MCNC: 10.1 MG/DL (ref 8.7–10.5)
CHLORIDE SERPL-SCNC: 111 MMOL/L (ref 95–110)
CHOLEST SERPL-MCNC: 270 MG/DL (ref 120–199)
CHOLEST/HDLC SERPL: 3.7 {RATIO} (ref 2–5)
CO2 SERPL-SCNC: 26 MMOL/L (ref 23–29)
COMPLEXED PSA SERPL-MCNC: 0.66 NG/ML (ref 0–4)
CREAT SERPL-MCNC: 1.2 MG/DL (ref 0.5–1.4)
ERYTHROCYTE [DISTWIDTH] IN BLOOD BY AUTOMATED COUNT: 12.8 % (ref 11.5–14.5)
EST. GFR  (NO RACE VARIABLE): >60 ML/MIN/1.73 M^2
ESTIMATED AVG GLUCOSE: 114 MG/DL (ref 68–131)
GLUCOSE SERPL-MCNC: 85 MG/DL (ref 70–110)
HBA1C MFR BLD: 5.6 % (ref 4–5.6)
HCT VFR BLD AUTO: 44 % (ref 40–54)
HDLC SERPL-MCNC: 73 MG/DL (ref 40–75)
HDLC SERPL: 27 % (ref 20–50)
HGB BLD-MCNC: 14.5 G/DL (ref 14–18)
LDLC SERPL CALC-MCNC: 170.8 MG/DL (ref 63–159)
MCH RBC QN AUTO: 29.1 PG (ref 27–31)
MCHC RBC AUTO-ENTMCNC: 33 G/DL (ref 32–36)
MCV RBC AUTO: 88 FL (ref 82–98)
NONHDLC SERPL-MCNC: 197 MG/DL
PLATELET # BLD AUTO: 232 K/UL (ref 150–450)
PMV BLD AUTO: 11 FL (ref 9.2–12.9)
POTASSIUM SERPL-SCNC: 4.5 MMOL/L (ref 3.5–5.1)
PROT SERPL-MCNC: 7.8 G/DL (ref 6–8.4)
RBC # BLD AUTO: 4.99 M/UL (ref 4.6–6.2)
SODIUM SERPL-SCNC: 144 MMOL/L (ref 136–145)
TRIGL SERPL-MCNC: 131 MG/DL (ref 30–150)
TSH SERPL DL<=0.005 MIU/L-ACNC: 1.36 UIU/ML (ref 0.4–4)
WBC # BLD AUTO: 4.9 K/UL (ref 3.9–12.7)

## 2023-10-04 PROCEDURE — 80061 LIPID PANEL: CPT | Performed by: STUDENT IN AN ORGANIZED HEALTH CARE EDUCATION/TRAINING PROGRAM

## 2023-10-04 PROCEDURE — 83036 HEMOGLOBIN GLYCOSYLATED A1C: CPT | Performed by: STUDENT IN AN ORGANIZED HEALTH CARE EDUCATION/TRAINING PROGRAM

## 2023-10-04 PROCEDURE — 85027 COMPLETE CBC AUTOMATED: CPT | Performed by: STUDENT IN AN ORGANIZED HEALTH CARE EDUCATION/TRAINING PROGRAM

## 2023-10-04 PROCEDURE — 3044F HG A1C LEVEL LT 7.0%: CPT | Mod: CPTII,S$GLB,, | Performed by: STUDENT IN AN ORGANIZED HEALTH CARE EDUCATION/TRAINING PROGRAM

## 2023-10-04 PROCEDURE — 84153 ASSAY OF PSA TOTAL: CPT | Performed by: STUDENT IN AN ORGANIZED HEALTH CARE EDUCATION/TRAINING PROGRAM

## 2023-10-04 PROCEDURE — 99999 PR PBB SHADOW E&M-EST. PATIENT-LVL I: ICD-10-PCS | Mod: PBBFAC,,,

## 2023-10-04 PROCEDURE — 84443 ASSAY THYROID STIM HORMONE: CPT | Performed by: STUDENT IN AN ORGANIZED HEALTH CARE EDUCATION/TRAINING PROGRAM

## 2023-10-04 PROCEDURE — 99999 PR PBB SHADOW E&M-EST. PATIENT-LVL IV: CPT | Mod: PBBFAC,,, | Performed by: STUDENT IN AN ORGANIZED HEALTH CARE EDUCATION/TRAINING PROGRAM

## 2023-10-04 PROCEDURE — 3074F PR MOST RECENT SYSTOLIC BLOOD PRESSURE < 130 MM HG: ICD-10-PCS | Mod: CPTII,S$GLB,, | Performed by: STUDENT IN AN ORGANIZED HEALTH CARE EDUCATION/TRAINING PROGRAM

## 2023-10-04 PROCEDURE — 99396 PREV VISIT EST AGE 40-64: CPT | Mod: S$GLB,,, | Performed by: STUDENT IN AN ORGANIZED HEALTH CARE EDUCATION/TRAINING PROGRAM

## 2023-10-04 PROCEDURE — 99396 PR PREVENTIVE VISIT,EST,40-64: ICD-10-PCS | Mod: S$GLB,,, | Performed by: STUDENT IN AN ORGANIZED HEALTH CARE EDUCATION/TRAINING PROGRAM

## 2023-10-04 PROCEDURE — 3008F PR BODY MASS INDEX (BMI) DOCUMENTED: ICD-10-PCS | Mod: CPTII,S$GLB,, | Performed by: STUDENT IN AN ORGANIZED HEALTH CARE EDUCATION/TRAINING PROGRAM

## 2023-10-04 PROCEDURE — 36415 COLL VENOUS BLD VENIPUNCTURE: CPT | Performed by: STUDENT IN AN ORGANIZED HEALTH CARE EDUCATION/TRAINING PROGRAM

## 2023-10-04 PROCEDURE — 3079F DIAST BP 80-89 MM HG: CPT | Mod: CPTII,S$GLB,, | Performed by: STUDENT IN AN ORGANIZED HEALTH CARE EDUCATION/TRAINING PROGRAM

## 2023-10-04 PROCEDURE — 3044F PR MOST RECENT HEMOGLOBIN A1C LEVEL <7.0%: ICD-10-PCS | Mod: CPTII,S$GLB,, | Performed by: STUDENT IN AN ORGANIZED HEALTH CARE EDUCATION/TRAINING PROGRAM

## 2023-10-04 PROCEDURE — 99999 PR PBB SHADOW E&M-EST. PATIENT-LVL I: CPT | Mod: PBBFAC,,,

## 2023-10-04 PROCEDURE — 1159F PR MEDICATION LIST DOCUMENTED IN MEDICAL RECORD: ICD-10-PCS | Mod: CPTII,S$GLB,, | Performed by: STUDENT IN AN ORGANIZED HEALTH CARE EDUCATION/TRAINING PROGRAM

## 2023-10-04 PROCEDURE — 3079F PR MOST RECENT DIASTOLIC BLOOD PRESSURE 80-89 MM HG: ICD-10-PCS | Mod: CPTII,S$GLB,, | Performed by: STUDENT IN AN ORGANIZED HEALTH CARE EDUCATION/TRAINING PROGRAM

## 2023-10-04 PROCEDURE — 80053 COMPREHEN METABOLIC PANEL: CPT | Performed by: STUDENT IN AN ORGANIZED HEALTH CARE EDUCATION/TRAINING PROGRAM

## 2023-10-04 PROCEDURE — 1159F MED LIST DOCD IN RCRD: CPT | Mod: CPTII,S$GLB,, | Performed by: STUDENT IN AN ORGANIZED HEALTH CARE EDUCATION/TRAINING PROGRAM

## 2023-10-04 PROCEDURE — 3008F BODY MASS INDEX DOCD: CPT | Mod: CPTII,S$GLB,, | Performed by: STUDENT IN AN ORGANIZED HEALTH CARE EDUCATION/TRAINING PROGRAM

## 2023-10-04 PROCEDURE — 99999 PR PBB SHADOW E&M-EST. PATIENT-LVL IV: ICD-10-PCS | Mod: PBBFAC,,, | Performed by: STUDENT IN AN ORGANIZED HEALTH CARE EDUCATION/TRAINING PROGRAM

## 2023-10-04 PROCEDURE — 3074F SYST BP LT 130 MM HG: CPT | Mod: CPTII,S$GLB,, | Performed by: STUDENT IN AN ORGANIZED HEALTH CARE EDUCATION/TRAINING PROGRAM

## 2023-10-04 RX ORDER — ALPRAZOLAM 0.5 MG/1
0.5 TABLET ORAL 3 TIMES DAILY PRN
Qty: 30 TABLET | Refills: 0 | Status: SHIPPED | OUTPATIENT
Start: 2023-10-04 | End: 2023-11-03

## 2023-10-04 RX ORDER — CICLOPIROX 80 MG/ML
SOLUTION TOPICAL NIGHTLY
Qty: 6.6 ML | Refills: 11 | Status: SHIPPED | OUTPATIENT
Start: 2023-10-04

## 2023-10-04 NOTE — PATIENT INSTRUCTIONS
A colon cancer screening test (Cologuard) has been ordered. This test will be mailed to your home. Please complete this test at home and mail back at your earliest convenience.     Medications as detailed below have been prescribed. Please  at the pharmacy and take as prescribed.     A coronary calcium CT has been ordered. Please schedule this at check out.

## 2023-10-04 NOTE — PROGRESS NOTES
[facility] EXECUTIVE HEALTH EXAM    CHIEF COMPLAINT: Executive health exam    HISTORY OF PRESENT ILLNESS: Caroline Raphael MD is a 48 y.o. male who presents here today for an executive health examination. 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 He has been with [employer] for 16 years. Medical history is remarkable for hyperlipidemia not requiring a statin and onychomycosis treated with topical penlac. He also has a herniated disc of his cervical spine for which he follows at the back and spine clinic. Patient also reports he has developed a fear of flying for which Xanax has been helpful. Patient otherwise denies any acute concerns today.    PAST MEDICAL HISTORY:  Past Medical History:   Diagnosis Date    Chronic pain of right knee     Possible iliotibial band syndrome (normal MRI)    DJD (degenerative joint disease) of cervical spine     Hyperlipidemia     Occipital neuralgia     Onychomycosis        MEDICATIONS:    Current Outpatient Medications:     ALPRAZolam (XANAX) 0.5 MG tablet, Take 1 tablet (0.5 mg total) by mouth 3 (three) times daily as needed for Anxiety., Disp: 30 tablet, Rfl: 0    ciclopirox (PENLAC) 8 % Soln, Apply topically nightly., Disp: 6.6 mL, Rfl: 11    PAST SURGICAL HISTORY:  History reviewed. No pertinent surgical history.    SOCIAL HISTORY:  Social History     Tobacco Use    Smoking status: Never    Smokeless tobacco: Never   Substance Use Topics    Alcohol use: Yes     Alcohol/week: 3.0 standard drinks of alcohol     Types: 3 Glasses of wine per week    Drug use: Never       ALLERGIES:  Review of patient's allergies indicates:   Allergen Reactions    No known drug allergies        FAMILY HISTORY:  Family History   Problem Relation Age of Onset    No Known Problems Mother     Heart disease Father     No Known Problems Sister     No Known Problems Maternal Grandmother     No Known Problems Maternal Grandfather     No Known Problems Paternal  "Grandmother     Multiple myeloma Paternal Grandfather     No Known Problems Son     No Known Problems Son     Colon cancer Neg Hx        PREVENTATIVE HEALTH:   Immunizations  Influenza: requested  COVID:  yes x 4  Tdap:  had within last 10 years per patient  Pneumonia: not indicated  Shingles: not indicated  Screenings  Prostate cancer:  PSA today  Colon cancer:  Never done - requests cologuard today  Lung cancer: not indicated  AAA: not indicated  Bone density: not indicated  Depression & Anxiety: Fear of flying as in HPI. Occasional feelings of depression since getting , feels it is manageable.   Interventions  Aspirin: No  Statin: No  Exercise: Yes, a few times per week  Diet: "not great"      PHYSICAL EXAM:    /86 (BP Location: Right arm, Patient Position: Sitting, BP Method: Medium (Manual))   Pulse 73   Ht 5' 9" (1.753 m)   Wt 68.4 kg (150 lb 12.7 oz)   SpO2 98%   BMI 22.27 kg/m²     Physical Exam  Vitals and nursing note reviewed.   Constitutional:       General: He is not in acute distress.     Appearance: Normal appearance. He is not ill-appearing, toxic-appearing or diaphoretic.   HENT:      Head: Normocephalic and atraumatic.      Right Ear: Tympanic membrane, ear canal and external ear normal.      Left Ear: Tympanic membrane, ear canal and external ear normal.   Eyes:      Extraocular Movements: Extraocular movements intact.      Conjunctiva/sclera: Conjunctivae normal.      Pupils: Pupils are equal, round, and reactive to light.   Cardiovascular:      Rate and Rhythm: Normal rate and regular rhythm.      Heart sounds: Normal heart sounds. No murmur heard.  Pulmonary:      Effort: Pulmonary effort is normal. No respiratory distress.      Breath sounds: Normal breath sounds. No wheezing.   Musculoskeletal:         General: No deformity. Normal range of motion.   Skin:     Findings: No lesion or rash.   Neurological:      General: No focal deficit present.      Mental Status: He is " alert.      Motor: No weakness.   Psychiatric:         Mood and Affect: Mood normal.         Behavior: Behavior normal.         Thought Content: Thought content normal.         Judgment: Judgment normal.            LAB REVIEW:    Lab Results   Component Value Date     10/04/2023    K 4.5 10/04/2023     (H) 10/04/2023    CO2 26 10/04/2023    BUN 20 10/04/2023    CREATININE 1.2 10/04/2023    CALCIUM 10.1 10/04/2023    ANIONGAP 7 (L) 10/04/2023    EGFRNORACEVR >60.0 10/04/2023       Lab Results   Component Value Date    ALT 21 10/04/2023    AST 22 10/04/2023    ALKPHOS 59 10/04/2023    BILITOT 0.6 10/04/2023       Lab Results   Component Value Date    WBC 4.90 10/04/2023    HGB 14.5 10/04/2023    HCT 44.0 10/04/2023    MCV 88 10/04/2023     10/04/2023       Lab Results   Component Value Date    TSH 1.363 10/04/2023       Lab Results   Component Value Date    HGBA1C 5.6 10/04/2023       Cholesterol   Date Value Ref Range Status   10/04/2023 270 (H) 120 - 199 mg/dL Final     Comment:     The National Cholesterol Education Program (NCEP) has set the  following guidelines (reference ranges) for Cholesterol:  Optimal.....................<200 mg/dL  Borderline High.............200-239 mg/dL  High........................> or = 240 mg/dL       HDL   Date Value Ref Range Status   10/04/2023 73 40 - 75 mg/dL Final     Comment:     The National Cholesterol Education Program (NCEP) has set the  following guidelines (reference values) for HDL Cholesterol:  Low...............<40 mg/dL  Optimal...........>60 mg/dL       LDL Cholesterol   Date Value Ref Range Status   10/04/2023 170.8 (H) 63.0 - 159.0 mg/dL Final     Comment:     The National Cholesterol Education Program (NCEP) has set the  following guidelines (reference values) for LDL Cholesterol:  Optimal.......................<130 mg/dL  Borderline High...............130-159 mg/dL  High..........................160-189 mg/dL  Very High.....................>190  mg/dL       Triglycerides   Date Value Ref Range Status   10/04/2023 131 30 - 150 mg/dL Final     Comment:     The National Cholesterol Education Program (NCEP) has set the  following guidelines (reference values) for triglycerides:  Normal......................<150 mg/dL  Borderline High.............150-199 mg/dL  High........................200-499 mg/dL         The 10-year ASCVD risk score (Rona JOSE, et al., 2019) is: 2.8%    Values used to calculate the score:      Age: 48 years      Sex: Male      Is Non- : No      Diabetic: No      Tobacco smoker: No      Systolic Blood Pressure: 118 mmHg      Is BP treated: No      HDL Cholesterol: 73 mg/dL      Total Cholesterol: 270 mg/dL      PSA, Screen (ng/mL)   Date Value   10/04/2023 0.66           ASSESSMENT & PLAN:    Caroline was seen today for [facility].    Diagnoses and all orders for this visit:    Annual physical exam  Health maintenance examination  Findings, health screenings, and immunizations as below  History, physical exam findings, and lab results are all acceptable with exception of what is detailed below    Dyslipidemia  Total cholesterol 270 and . Patient has low ASCVD risk at 2.8%.  Statin not indicated. Encouraged healthy lifestyle.     Onychomycosis  Stable and well controlled.  Continue current regimen topical penlac - refilled  -     ciclopirox (PENLAC) 8 % Soln; Apply topically nightly.    Flying phobia  Patient has developed a fear of flying for which xanax has been helpful.   PDMP reviewed - no suspicious activity.   Low dose Xanax prescribed.   R/B/A of medication discussed with patient. He is to use only while flying and never while working.   Patient to return for further refills.   -     ALPRAZolam (XANAX) 0.5 MG tablet; Take 1 tablet (0.5 mg total) by mouth 3 (three) times daily as needed for Anxiety.    Encounter for screening for cardiovascular disorders  Patient has family history of early CAD as well  as personal history of dyslipidemia - calcium coronary scan ordered to evaluate cardiac risk further  -     CT Cardiac Scoring; Future; Expected date: 10/04/2023    Colon cancer screening  No prior screening. Ordered today.  -     Cologuard Screening (Multitarget Stool DNA); Future; Expected date: 10/04/2023    Need for vaccination  Influenza vaccine(s) ordered to be completed today        Return to clinic in 12 months or sooner as needed.    Mitra Perez MD  [facility]  10/05/2023

## 2023-10-04 NOTE — LETTER
October 5, 2023    Caroline Raphael MD  [address] [facility] [address]             [facility]  [address]  [place] [address]  Phone: [phone]  Fax: [phone] Dear Dr. Raphael:      It was a pleasure seeing you in clinic for your Executive Health exam on 10/4/2023. Enclosed is a copy of the clinic note detailing the history, physical exam findings, laboratory studies, and recommendations at that time. Thank you for allowing me to participate in your medical care.             If you have any questions or concerns, please don't hesitate to call.    Sincerely,        Mitra Perez MD

## 2023-10-05 ENCOUNTER — CLINICAL SUPPORT (OUTPATIENT)
Dept: REHABILITATION | Facility: HOSPITAL | Age: 48
End: 2023-10-05
Payer: COMMERCIAL

## 2023-10-05 DIAGNOSIS — R29.898 IMPAIRED STRENGTH OF NECK MUSCLES: Primary | ICD-10-CM

## 2023-10-05 PROCEDURE — 97750 PHYSICAL PERFORMANCE TEST: CPT | Mod: 32

## 2023-10-05 NOTE — PROGRESS NOTES
[facility]      Name: Caroline Raphael MD  Clinic Number: [id number]    Therapy Diagnosis:   Encounter Diagnosis   Name Primary?    Impaired strength of neck muscles Yes             Physician: Sonal Reagan, *    Visit Date: 10/5/2023    Physician: Sonal Reagan, *     Physician Orders: PT Eval and Treat    Medical Diagnosis from Referral:   M54.2 (ICD-10-CM) - Neck pain   M47.812 (ICD-10-CM) - Spondylosis of cervical region without myelopathy or radiculopathy      Evaluation Date: 6/15/2023  Authorization Period Expiration: 6/15/23  Plan of Care Expiration: 2023 sent through 10/17/23  Reassessment Due:  10/21/23  Visit # / Visits authorized: 10/20       Time In: 2:30 pm  Time Out: 3:30 pm  Total Billable Time: 60 minutes  INSURANCE and OUTCOMES: Program Benefit Group with Cervical Outcomes (NDI and AQoL)      Precautions: standard     Pattern of pain determined: 1 REP     Subjective    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 States he has been performing stretching throughout the day, and altering his position during surgery to improve ergonomics.    Patient reports their pain to be 1/10 on a 0-10 scale with 0 being no pain and 10 being the worst pain imaginable.  Pain Location: neck     Occupation: ophthalmology Physician   Leisure: working out      Pts goals: prevention, strengthen neck, His goals are working with less pain, ROM of neck, and better sleep.     Objective       Range of Motion - MOVEMENT LOSS     ROM Loss 23   Flexion within functional limits   Extension within functional limits   Side bending Right within functional limits   Side bending Left within functional limits   Rotation Right within functional limits   Rotation Left within functional limits   Protraction within functional limits   Retraction  No loss, functional        Baseline Isometric Testing on Med X equipment: Testing administered by PT     Date of testin/15/23  ROM  18-90 deg   Max Peak Torque 208    Min Peak Torque 93    Flex/Ext Ratio 2/1   % below normative data -50%         Midpoint Isometric Testing on Med X equipment: Testing administered by PT     Date of testin23  ROM  deg   Max Peak Torque 293   Min Peak Torque 200   Flex/Ext Ratio 2/1   % below normative data -25% with 62% gain in strength              Outcomes:  Initial score:  NDI 8/50 or 16% impaired  Visit 9 NDI 5/50 or 10%   Goal:  < 5/50 or < 10% impaired      Treatment    Caroline received the treatments listed below:      Caroline received neuromuscular education  to isolate and engage spinal stabilization musculature correctly for motor control and coordination to aid in function and posture for 10 minutes on the Medical Medx Machine.  Patient performed MedX dynamic exercise with emphasis on spinal muscular control using pacer throughout  active range of motion. Therapist assisted patient in achieving optimal exertion for neural reeducation and endurance training by using the  Fransico Exertion Rating scale, by instructing the patient to aim for mid range of exertion, performing 15-20 repetitions, slowly, correctly,and safely.         10/5/2023     2:57 PM   [facility]   Visit Number 10   VAS Pain Rating 1   Treadmill Time (in min.) 8 min   Retraction in Sitting 10   Retraction with Extension 10   Sidebending 2   Rotation 2   Scapular Retraction 10   Manual Therapy 8 mins.   Cervical Weight 147 lbs   Repetitions 20   Rating of Perceived Exertion 4   Ice - Z Lie (in min.) 5          therapeutic exercises to develop strength, endurance, ROM, and posture for 50 minutes including:     Neck retractions 10 reps  Neck retraction ext 10 with towel for support and rotation  Thoracic extension over chair with 1/2 foam 10 reps with therapist over pressure  Trap stretch 20 sec bilat  Lev scap stretch 20 sec bilat  Open books 10 reps bilat  standing over head row 2 plates 15 reps inspire with handles   Bird  dog, opposite arm and leg lift with rows with 8 lb weight 10 reps bilat    NP:  Prone neck extensions off mat edge for strengthening x 15 -NP  Supine neck and T spine ext off mat edge for stretching -NP  squat row with 2 plates 15 reps inspire with bar -NP  -lifts 2 plates 15 reps inspire bilat inspire with handles  -NP  Supine bridge with green theraband horizontal abduction 15 reps      Peripheral muscle strengthening which included 1 set of 15-20 repetitions at a slow, controlled 10-13 second per rep pace focused on strengthening supporting musculature for improved body mechanics and functional mobility.  Pt and therapist focused on proper form during treatment to ensure optimal strengthening of each targeted muscle group.  Machines were utilized including torso rotation, leg press, hip abd and hip add, leg ext.  Leg curl, triceps, biceps, chest and row added visit 3    therapeutic activities to improve functional performance for 0  minutes, including:     manual therapy techniques: sub occipital release, manual traction x 8 min    cold pack for 5 minutes to neck     Home Exercises Provided and Patient Education Provided   Home exercises include:  Neck retractions 10 reps  Neck retraction ext 10 with towel for support and rotation  Thoracic extension over chair with 1/2 foam 10 reps with therapist over pressure  Open books 10 reps bilat  Supine thoracic stretch over bed edge  No money's with green band  Prone neck raises using gravity for resistance 10 reps  Cardio program: he tries to run when he has time  Lifting education date:   Posture/Lumbar roll:  yes  Fridge Magnet Discharge handout (date given):  Equipment at home/gym membership:  belongs to gym      Education provided:   - review posture and stretches, testing results from medx test  -safe procedure testing with breathing in and breathing out with push, building through safe comfortable effort      Written Home Exercises Provided: Patient instructed to  cont prior HEP with additions of no money, and prone head raises  Exercises were reviewed and Pattin was able to demonstrate them prior to the end of the session.  Caroline demonstrated good  understanding of the education provided.     See EMR under Patient Instructions for exercises provided 7/13/23        Assessment     Patient arrives today with min c/o pain.  Continued with cervical mobility and postural exercises.  Performed 8 min sub occipital release and manual traction of cervical region.  Pt performed 10 reps and noted  pt was not attaining full ROM , examiner set ROM at 102 instead of 108.  After 10 visits machine re set and exercise continued. Pt completed 20 reps all together. Increase 5% next visit.    Patient is making good progress towards established goals.  Pt will continue to benefit from skilled outpatient physical therapy to address the deficits stated in the impairment chart, provide pt/family education and to maximize pt's level of independence in the home and community environment.     Anticipated Barriers for therapy: nil  Pt's spiritual, cultural and educational needs considered and pt agreeable to plan of care and goals as stated below:     GOALS: Pt is in agreement with the following goals.     Short term goals: 6 weeks or 10 visits   - Pt will demonstratte increased cervical ROM as measured by med ex by 3 degrees from initial test which results in improved  ROM of neck for ease with ADLs and driving. MET 8/31/23  - Pt will demonstrate independence with reducing or controlling symptoms with ther ex, movement, or position independently, able to reduce pain 1-2 points on pain scale using strategies taught in therapy.  (MET 9/21/23)  - Pt will demonstrate increased MedX average isometric strength value by 15% with  when compared to the initial testing resulting in improved ability to perform bending, lifting, and carrying activities safely and confidently.  (MET 9/21/23)  Long term goals: 10  weeks or 20 visits  - Pt will demonstratte increased cervical ROM as measured by med ex by 8 degrees from initial test which results in functional ROM of neck for ease with ADLs and driving. MET 8/31/23  - Pt will demonstrate increased MedX average isometric strength value  by 40% from initial test to improve ability to lift and carry, and sustain good posture while performing ADL's.  (MET 9/21/23)  - Pt will demonstrate reduced pain and improved functional outcomes as reported on the Neck Disability Index by reaching a score of 5/50 or less in order to demonstrate subjective improvement in pt's condition.   (MET 9/21/23)  - Pt will demonstrate independence with reducing or controlling symptoms with ther ex, movement, or position independently, able to reduce pain 2-4 points on pain scale using strategies taught in therapy. Appropriate and Ongoing  - Pt will demonstrate independence with the HEP at discharge. Appropriate and Ongoing  - sleep well.  Perform surgery without difficulty  Appropriate and Ongoing    Plan   Continue with established Plan of Care towards established PT goals.       Therapist: Katie Sam, PT  10/5/2023

## 2023-10-09 ENCOUNTER — CLINICAL SUPPORT (OUTPATIENT)
Dept: REHABILITATION | Facility: HOSPITAL | Age: 48
End: 2023-10-09
Payer: COMMERCIAL

## 2023-10-09 DIAGNOSIS — R29.898 IMPAIRED STRENGTH OF NECK MUSCLES: Primary | ICD-10-CM

## 2023-10-09 PROCEDURE — 97750 PHYSICAL PERFORMANCE TEST: CPT | Mod: 32

## 2023-10-09 NOTE — PROGRESS NOTES
[facility]      Name: Caroline Raphael MD  Clinic Number: [id number]    Therapy Diagnosis:   Encounter Diagnosis   Name Primary?    Impaired strength of neck muscles Yes             Physician: Sonal Reagan, *    Visit Date: 10/9/2023    Physician: Sonal Reagan, *     Physician Orders: PT Eval and Treat    Medical Diagnosis from Referral:   M54.2 (ICD-10-CM) - Neck pain   M47.812 (ICD-10-CM) - Spondylosis of cervical region without myelopathy or radiculopathy      Evaluation Date: 6/15/2023  Authorization Period Expiration: 6/15/23  Plan of Care Expiration: 2023 sent through 10/17/23  Reassessment Due:  10/21/23  Visit # / Visits authorized:        Time In: 3:00 pm  Time Out: 4:00 pm  Total Billable Time: 60 minutes  INSURANCE and OUTCOMES: Program Benefit Group with Cervical Outcomes (NDI and AQoL)      Precautions: standard     Pattern of pain determined: 1 REP     Subjective    Dr. Raphael reports neck stiffness, mild pain 2/10 on VAS.  Pt reports overall fatigue from busy weekend.    Patient reports their pain to be 2/10 on a 0-10 scale with 0 being no pain and 10 being the worst pain imaginable.  Pain Location: neck     Occupation: ophthalmology Physician   Leisure: working out      Pts goals: prevention, strengthen neck, His goals are working with less pain, ROM of neck, and better sleep.     Objective       Range of Motion - MOVEMENT LOSS     ROM Loss 23   Flexion within functional limits   Extension within functional limits   Side bending Right within functional limits   Side bending Left within functional limits   Rotation Right within functional limits   Rotation Left within functional limits   Protraction within functional limits   Retraction  No loss, functional        Baseline Isometric Testing on Med X equipment: Testing administered by PT     Date of testin/15/23  ROM 18-90 deg   Max Peak Torque 208    Min Peak Torque 93    Flex/Ext  Ratio 2/1   % below normative data -50%         Midpoint Isometric Testing on Med X equipment: Testing administered by PT     Date of testin23  ROM  deg   Max Peak Torque 293   Min Peak Torque 200   Flex/Ext Ratio 2/1   % below normative data -25% with 62% gain in strength              Outcomes:  Initial score:  NDI 8/50 or 16% impaired  Visit 9 NDI 5/50 or 10%   Goal:  < 5/50 or < 10% impaired      Treatment    Caroline received the treatments listed below:      Caroline received neuromuscular education  to isolate and engage spinal stabilization musculature correctly for motor control and coordination to aid in function and posture for 10 minutes on the Medical Medx Machine.  Patient performed MedX dynamic exercise with emphasis on spinal muscular control using pacer throughout  active range of motion. Therapist assisted patient in achieving optimal exertion for neural reeducation and endurance training by using the  Fransico Exertion Rating scale, by instructing the patient to aim for mid range of exertion, performing 15-20 repetitions, slowly, correctly,and safely.           10/9/2023     4:18 PM   [facility]   Visit Number 11   VAS Pain Rating 2   Treadmill Time (in min.) 5 min   Retraction in Sitting 10   Retraction with Extension 10   Sidebending 2   Rotation 2   Scapular Retraction 10   Manual Therapy 8 mins.   Cervical Weight 147 lbs   Repetitions 20   Rating of Perceived Exertion 5        therapeutic exercises to develop strength, endurance, ROM, and posture for 50 minutes including:     Neck retractions 10 reps  Neck retraction ext 10 with towel for support and rotation  Thoracic extension over chair with full foam roll x 10 reps with therapist over pressure  Trap stretch 20 sec bilat  Lev scap stretch 20 sec bilat  Open books 10 reps bilat  standing over head row 2 plates 15 reps inspire with handles  Bird dog, opposite arm and leg lift with rows with 8 lb weight 10 reps  bilat    NP:  Prone neck extensions off mat edge for strengthening x 15 -NP  Supine neck and T spine ext off mat edge for stretching -NP  squat row with 2 plates 15 reps inspire with bar -NP  -lifts 2 plates 15 reps inspire bilat inspire with handles  -NP  Supine bridge with green theraband horizontal abduction 15 reps      Peripheral muscle strengthening which included 1 set of 15-20 repetitions at a slow, controlled 10-13 second per rep pace focused on strengthening supporting musculature for improved body mechanics and functional mobility.  Pt and therapist focused on proper form during treatment to ensure optimal strengthening of each targeted muscle group.  Machines were utilized including torso rotation, leg press, hip abd and hip add, leg ext.  Leg curl, triceps, biceps, chest and row added visit 3    manual therapy techniques: sub occipital release, manual traction x 8 min    cold pack for 5 minutes to neck     Home Exercises Provided and Patient Education Provided   Home exercises include:  Neck retractions 10 reps  Neck retraction ext 10 with towel for support and rotation  Thoracic extension over chair with 1/2 foam 10 reps with therapist over pressure  Open books 10 reps bilat  Supine thoracic stretch over bed edge  No money's with green band  Prone neck raises using gravity for resistance 10 reps  Cardio program: he tries to run when he has time  Lifting education date: 10/9/2023  Posture/Lumbar roll:  yes  [employer] Discharge handout (date given):  Equipment at home/gym membership:  belongs to gym      Education provided:   - review posture and stretches, testing results from medx test  -safe procedure testing with breathing in and breathing out with push, building through safe comfortable effort      Written Home Exercises Provided: Patient instructed to cont prior HEP with additions of no money, and prone head raises  Exercises were reviewed and Caroline was able to demonstrate them prior to the end  of the session.  Caroline demonstrated good  understanding of the education provided.     See EMR under Patient Instructions for exercises provided 7/13/23        Assessment     Patient arrives today with min c/o pain, increased fatigue and stiffness from busy weekend.  Continued with cervical mobility and postural exercises.  Performed 8 min sub occipital release and manual traction of cervical region at the end of session.  Dr. Raphael's resistance on Cervical MedX machine maintained at 147, he was able to complete 20 reps at 5/10 rating of exertion.  Did not increase weight this session due to fatigue levels at start of session.  Increase 5% next visit.    Patient is making good progress towards established goals.  Pt will continue to benefit from skilled outpatient physical therapy to address the deficits stated in the impairment chart, provide pt/family education and to maximize pt's level of independence in the home and community environment.     Anticipated Barriers for therapy: nil  Pt's spiritual, cultural and educational needs considered and pt agreeable to plan of care and goals as stated below:     GOALS: Pt is in agreement with the following goals.     Short term goals: 6 weeks or 10 visits   - Pt will demonstratte increased cervical ROM as measured by med ex by 3 degrees from initial test which results in improved  ROM of neck for ease with ADLs and driving. MET 8/31/23  - Pt will demonstrate independence with reducing or controlling symptoms with ther ex, movement, or position independently, able to reduce pain 1-2 points on pain scale using strategies taught in therapy.  (MET 9/21/23)  - Pt will demonstrate increased MedX average isometric strength value by 15% with  when compared to the initial testing resulting in improved ability to perform bending, lifting, and carrying activities safely and confidently.  (MET 9/21/23)  Long term goals: 10 weeks or 20 visits  - Pt will demonstratte increased cervical  ROM as measured by med ex by 8 degrees from initial test which results in functional ROM of neck for ease with ADLs and driving. MET 8/31/23  - Pt will demonstrate increased MedX average isometric strength value  by 40% from initial test to improve ability to lift and carry, and sustain good posture while performing ADL's.  (MET 9/21/23)  - Pt will demonstrate reduced pain and improved functional outcomes as reported on the Neck Disability Index by reaching a score of 5/50 or less in order to demonstrate subjective improvement in pt's condition.   (MET 9/21/23)  - Pt will demonstrate independence with reducing or controlling symptoms with ther ex, movement, or position independently, able to reduce pain 2-4 points on pain scale using strategies taught in therapy. Appropriate and Ongoing  - Pt will demonstrate independence with the HEP at discharge. Appropriate and Ongoing  - sleep well.  Perform surgery without difficulty  Appropriate and Ongoing    Plan   Continue with established Plan of Care towards established PT goals.       Therapist: Albertina Hastings, PT  10/9/2023

## 2023-10-12 ENCOUNTER — CLINICAL SUPPORT (OUTPATIENT)
Dept: REHABILITATION | Facility: HOSPITAL | Age: 48
End: 2023-10-12
Payer: COMMERCIAL

## 2023-10-12 DIAGNOSIS — R29.898 IMPAIRED STRENGTH OF NECK MUSCLES: Primary | ICD-10-CM

## 2023-10-12 PROCEDURE — 97750 PHYSICAL PERFORMANCE TEST: CPT | Mod: 32

## 2023-10-12 NOTE — PROGRESS NOTES
[facility]      Name: Caroline Raphael MD  Clinic Number: [id number]    Therapy Diagnosis:   Encounter Diagnosis   Name Primary?    Impaired strength of neck muscles Yes             Physician: Sonal Reagan, *    Visit Date: 10/12/2023    Physician: Sonal Reagan, *     Physician Orders: PT Eval and Treat    Medical Diagnosis from Referral:   M54.2 (ICD-10-CM) - Neck pain   M47.812 (ICD-10-CM) - Spondylosis of cervical region without myelopathy or radiculopathy      Evaluation Date: 6/15/2023  Authorization Period Expiration: 6/15/23  Plan of Care Expiration: 2023 sent through 10/17/23  Reassessment Due:  10/21/23  Visit # / Visits authorized:        Time In: 230  Time Out: 330  Total Billable Time: 60 minutes  INSURANCE and OUTCOMES: Program Benefit Group with Cervical Outcomes (NDI and AQoL)      Precautions: standard     Pattern of pain determined: 1 REP     Subjective    Dr. Raphael reports continued neck stiffness.  Pain Location: neck  Occupation: ophthalmology Physician   Leisure: working out      Pts goals: prevention, strengthen neck, His goals are working with less pain, ROM of neck, and better sleep.     Objective       Range of Motion - MOVEMENT LOSS     ROM Loss 23   Flexion within functional limits   Extension within functional limits   Side bending Right within functional limits   Side bending Left within functional limits   Rotation Right within functional limits   Rotation Left within functional limits   Protraction within functional limits   Retraction  No loss, functional        Baseline Isometric Testing on Med X equipment: Testing administered by PT     Date of testin/15/23  ROM 18-90 deg   Max Peak Torque 208    Min Peak Torque 93    Flex/Ext Ratio 2/1   % below normative data -50%         Midpoint Isometric Testing on Med X equipment: Testing administered by PT     Date of testin23  ROM  deg   Max Peak Torque  293   Min Peak Torque 200   Flex/Ext Ratio 2/1   % below normative data -25% with 62% gain in strength              Outcomes:  Initial score:  NDI 8/50 or 16% impaired  Visit 9 NDI 5/50 or 10%   Goal:  < 5/50 or < 10% impaired      Treatment    Caroline received the treatments listed below:      Caroline received neuromuscular education  to isolate and engage spinal stabilization musculature correctly for motor control and coordination to aid in function and posture for 10 minutes on the Medical Medx Machine.  Patient performed MedX dynamic exercise with emphasis on spinal muscular control using pacer throughout  active range of motion. Therapist assisted patient in achieving optimal exertion for neural reeducation and endurance training by using the  Fransico Exertion Rating scale, by instructing the patient to aim for mid range of exertion, performing 15-20 repetitions, slowly, correctly,and safely.           10/12/2023     2:59 PM   [facility]   Visit Number 12   VAS Pain Rating 0   Treadmill Time (in min.) 5 min   Retraction in Sitting 10   Retraction with Extension 10   Sidebending 2   Rotation 2   Scapular Retraction 10   Manual Therapy 5 mins.   Cervical Weight 156 lbs   Repetitions 15   Rating of Perceived Exertion 5   Ice - Z Lie (in min.) 5       therapeutic exercises to develop strength, endurance, ROM, and posture for 50 minutes including:     Neck retractions 10 reps  Neck retraction ext 10 with towel for support and rotation  Thoracic extension over chair with full foam roll x 10 reps with therapist over pressure  Trap stretch 20 sec bilat  Lev scap stretch 20 sec bilat  Open books 10 reps bilat  standing over head row 2 plates 15 reps inspire with handles  Bird dog, opposite arm and leg lift with rows with 8 lb weight 10 reps bilat    NP:  Prone neck extensions off mat edge for strengthening x 15 -NP  Supine neck and T spine ext off mat edge for stretching -NP  squat row with 2 plates 15 reps inspire  with bar -NP  -lifts 2 plates 15 reps inspire bilat inspire with handles  -NP  Supine bridge with green theraband horizontal abduction 15 reps    Peripheral muscle strengthening which included 1 set of 15-20 repetitions at a slow, controlled 10-13 second per rep pace focused on strengthening supporting musculature for improved body mechanics and functional mobility.  Pt and therapist focused on proper form during treatment to ensure optimal strengthening of each targeted muscle group.  Machines were utilized including torso rotation, leg press, hip abd and hip add, leg ext.  Leg curl, triceps, biceps, chest and row added visit 3    manual therapy techniques: sub occipital release, manual traction x 5 min    cold pack for 5 minutes to neck     Home Exercises Provided and Patient Education Provided   Home exercises include:  Neck retractions 10 reps  Neck retraction ext 10 with towel for support and rotation  Thoracic extension over chair with 1/2 foam 10 reps with therapist over pressure  Open books 10 reps bilat  Supine thoracic stretch over bed edge  No money's with green band  Prone neck raises using gravity for resistance 10 reps  Cardio program: he tries to run when he has time  Lifting education date: 10/9/2023  Posture/Lumbar roll:  yes  [employer] Discharge handout (date given):  Equipment at home/gym membership:  belongs to gym      Education provided:   - review posture and stretches, testing results from medx test  -safe procedure testing with breathing in and breathing out with push, building through safe comfortable effort      Written Home Exercises Provided: Patient instructed to cont prior HEP with additions of no money, and prone head raises  Exercises were reviewed and Caroline was able to demonstrate them prior to the end of the session.  Caroline demonstrated good  understanding of the education provided.     See EMR under Patient Instructions for exercises provided 7/13/23        Assessment      Patient arrives today with min c/o pain.  Continued with cervical mobility and postural exercises.  Performed 5 min sub occipital release and manual traction of cervical region.  Dr. Raphael's resistance on Cervical MedX machine increased to 156in/lbs,he was able to complete 15 reps at 5/10 rating of exertion.  Did not increase weight this session due to fatigue levels at start of session.     Patient is making good progress towards established goals.  Pt will continue to benefit from skilled outpatient physical therapy to address the deficits stated in the impairment chart, provide pt/family education and to maximize pt's level of independence in the home and community environment.     Anticipated Barriers for therapy: nil  Pt's spiritual, cultural and educational needs considered and pt agreeable to plan of care and goals as stated below:     GOALS: Pt is in agreement with the following goals.     Short term goals: 6 weeks or 10 visits   - Pt will demonstratte increased cervical ROM as measured by med ex by 3 degrees from initial test which results in improved  ROM of neck for ease with ADLs and driving. MET 8/31/23  - Pt will demonstrate independence with reducing or controlling symptoms with ther ex, movement, or position independently, able to reduce pain 1-2 points on pain scale using strategies taught in therapy.  (MET 9/21/23)  - Pt will demonstrate increased MedX average isometric strength value by 15% with  when compared to the initial testing resulting in improved ability to perform bending, lifting, and carrying activities safely and confidently.  (MET 9/21/23)  Long term goals: 10 weeks or 20 visits  - Pt will demonstratte increased cervical ROM as measured by med ex by 8 degrees from initial test which results in functional ROM of neck for ease with ADLs and driving. MET 8/31/23  - Pt will demonstrate increased MedX average isometric strength value  by 40% from initial test to improve ability to lift  and carry, and sustain good posture while performing ADL's.  (MET 9/21/23)  - Pt will demonstrate reduced pain and improved functional outcomes as reported on the Neck Disability Index by reaching a score of 5/50 or less in order to demonstrate subjective improvement in pt's condition.   (MET 9/21/23)  - Pt will demonstrate independence with reducing or controlling symptoms with ther ex, movement, or position independently, able to reduce pain 2-4 points on pain scale using strategies taught in therapy. Appropriate and Ongoing  - Pt will demonstrate independence with the HEP at discharge. Appropriate and Ongoing  - sleep well.  Perform surgery without difficulty  Appropriate and Ongoing    Plan   Continue with established Plan of Care towards established PT goals.       Therapist: Katie Sam, PT  10/12/2023

## 2023-10-19 ENCOUNTER — CLINICAL SUPPORT (OUTPATIENT)
Dept: REHABILITATION | Facility: HOSPITAL | Age: 48
End: 2023-10-19
Payer: COMMERCIAL

## 2023-10-19 DIAGNOSIS — R29.898 IMPAIRED STRENGTH OF NECK MUSCLES: Primary | ICD-10-CM

## 2023-10-19 PROCEDURE — 97750 PHYSICAL PERFORMANCE TEST: CPT | Mod: 32 | Performed by: PHYSICAL MEDICINE & REHABILITATION

## 2023-10-19 NOTE — PROGRESS NOTES
[facility]      Name: Caroline Raphael MD  Clinic Number: [id number]    Therapy Diagnosis:   Encounter Diagnosis   Name Primary?    Impaired strength of neck muscles Yes               Physician: Sonal Reagan, *    Visit Date: 10/19/2023    Physician: Sonal Reagan, *     Physician Orders: PT Eval and Treat    Medical Diagnosis from Referral:   M54.2 (ICD-10-CM) - Neck pain   M47.812 (ICD-10-CM) - Spondylosis of cervical region without myelopathy or radiculopathy      Evaluation Date: 6/15/2023  Authorization Period Expiration: 6/15/23  Plan of Care Expiration: sent through 23  Reassessment Due:  23  Visit # / Visits authorized:        Time In: 230  Time Out: 330  Total Billable Time: 60 minutes  INSURANCE and OUTCOMES: Program Benefit Group with Cervical Outcomes (NDI and AQoL)      Precautions: standard     Pattern of pain determined: 1 REP     Subjective    Dr. Raphael reports continued neck stiffness at times, but better over all and stretches help manage symptoms.    Pain Location: neck  Occupation: ophthalmology Physician   Leisure: working out      Pts goals: prevention, strengthen neck, His goals are working with less pain, ROM of neck, and better sleep.     Objective       Range of Motion - MOVEMENT LOSS     ROM Loss 10/19/23   Flexion within functional limits   Extension within functional limits   Side bending Right within functional limits   Side bending Left within functional limits   Rotation Right within functional limits   Rotation Left within functional limits   Protraction within functional limits   Retraction  No loss, functional        Baseline Isometric Testing on Med X equipment: Testing administered by PT     Date of testin/15/23  ROM 18-90 deg   Max Peak Torque 208    Min Peak Torque 93    Flex/Ext Ratio 2/1   % below normative data -50%         Midpoint Isometric Testing on Med X equipment: Testing administered by PT      Date of testin23  ROM  deg   Max Peak Torque 293   Min Peak Torque 200   Flex/Ext Ratio 2/1   % below normative data -25% with 62% gain in strength              Outcomes:  Initial score:  NDI 8/50 or 16% impaired  Visit 9 NDI 5/50 or 10%   Goal:  < 5/50 or < 10% impaired      Treatment    Caroline received the treatments listed below:      Caroline received neuromuscular education  to isolate and engage spinal stabilization musculature correctly for motor control and coordination to aid in function and posture for 10 minutes on the Medical Medx Machine.  Patient performed MedX dynamic exercise with emphasis on spinal muscular control using pacer throughout  active range of motion. Therapist assisted patient in achieving optimal exertion for neural reeducation and endurance training by using the  Fransico Exertion Rating scale, by instructing the patient to aim for mid range of exertion, performing 15-20 repetitions, slowly, correctly,and safely.           10/19/2023     4:06 PM   [facility]   Visit Number 13   VAS Pain Rating 0   Treadmill Time (in min.) 5 min   Retraction in Sitting 10   Retraction with Extension 10   Sidebending 2   Rotation 2   Scapular Retraction 10   Manual Therapy 5 mins.   Cervical Weight 156 lbs   Repetitions 17   Rating of Perceived Exertion 4   Ice - Z Lie (in min.) 5            therapeutic exercises to develop strength, endurance, ROM, and posture for 50 minutes including:     Neck retractions 10 reps  Neck retraction ext 10 with towel for support and rotation  Thoracic extension over chair with full foam roll x 10 reps with therapist over pressure  Trap stretch 20 sec bilat  Lev scap stretch 20 sec bilat  Open books 10 reps bilat  standing over head row 2 plates 15 reps inspire with handles  Bird dog, opposite arm and leg lift with rows with 8 lb weight 10 reps bilat  Bird dog rowing with 8 lbs weight   Black theraband bow and arrow 20    NP:  Prone neck extensions off mat  edge for strengthening x 15 -NP  Supine neck and T spine ext off mat edge for stretching -NP  squat row with 2 plates 15 reps inspire with bar -NP  -lifts 2 plates 15 reps inspire bilat inspire with handles  -NP  Supine bridge with green theraband horizontal abduction 15 reps    Peripheral muscle strengthening which included 1 set of 15-20 repetitions at a slow, controlled 10-13 second per rep pace focused on strengthening supporting musculature for improved body mechanics and functional mobility.  Pt and therapist focused on proper form during treatment to ensure optimal strengthening of each targeted muscle group.  Machines were utilized including torso rotation, leg press, hip abd and hip add, leg ext.  Leg curl, triceps, biceps, chest and row added visit 3    manual therapy techniques: sub occipital release, manual traction x 5 min    cold pack for 5 minutes to neck     Home Exercises Provided and Patient Education Provided   Home exercises include:  Neck retractions 10 reps  Neck retraction ext 10 with towel for support and rotation  Thoracic extension over chair with 1/2 foam 10 reps with therapist over pressure  Open books 10 reps bilat  Supine thoracic stretch over bed edge  No money's with green band  Prone neck raises using gravity for resistance 10 reps  Cardio program: he tries to run when he has time  Lifting education date: 10/9/2023  Posture/Lumbar roll:  yes  Fridge Magn[employer] Discharge handout (date given):  Equipment at home/gym membership:  belongs to gym      Education provided:   - review posture and stretches, testing results from medx test  -safe procedure testing with breathing in and breathing out with push, building through safe comfortable effort      Written Home Exercises Provided: Patient instructed to cont prior HEP with additions of no money, and prone head raises  Exercises were reviewed and Caroline was able to demonstrate them prior to the end of the session.  Caroline demonstrated good   understanding of the education provided.     See EMR under Patient Instructions for exercises provided 7/13/23        Assessment     Patient arrives today with no real symptoms.   Continued with cervical mobility and postural exercises.  Performed 5 min sub occipital release and manual traction of cervical region.  Encouraged extension to 18 only and not past on medx to see if he has any change as he reports slight headache after exercise, as previously noted he would extend to 6 degrees.   Dr. 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      Did not increase weight this session due to fatigue levels at start of session.     Patient is making good progress towards established goals.  Pt will continue to benefit from skilled outpatient physical therapy to address the deficits stated in the impairment chart, provide pt/family education and to maximize pt's level of independence in the home and community environment.     Anticipated Barriers for therapy: nil  Pt's spiritual, cultural and educational needs considered and pt agreeable to plan of care and goals as stated below:     GOALS: Pt is in agreement with the following goals.     Short term goals: 6 weeks or 10 visits   - Pt will demonstratte increased cervical ROM as measured by med ex by 3 degrees from initial test which results in improved  ROM of neck for ease with ADLs and driving. MET 8/31/23  - Pt will demonstrate independence with reducing or controlling symptoms with ther ex, movement, or position independently, able to reduce pain 1-2 points on pain scale using strategies taught in therapy.  (MET 9/21/23)  - Pt will demonstrate increased MedX average isometric strength value by 15% with  when compared to the initial testing resulting in improved ability to perform bending, lifting, and carrying activities safely and confidently.  (MET 9/21/23)  Long term goals: 10 weeks or  20 visits  - Pt will demonstratte increased cervical ROM as measured by med ex by 8 degrees from initial test which results in functional ROM of neck for ease with ADLs and driving. MET 8/31/23  - Pt will demonstrate increased MedX average isometric strength value  by 40% from initial test to improve ability to lift and carry, and sustain good posture while performing ADL's.  (MET 9/21/23)  - Pt will demonstrate reduced pain and improved functional outcomes as reported on the Neck Disability Index by reaching a score of 5/50 or less in order to demonstrate subjective improvement in pt's condition.   (MET 9/21/23)  - Pt will demonstrate independence with reducing or controlling symptoms with ther ex, movement, or position independently, able to reduce pain 2-4 points on pain scale using strategies taught in therapy. Appropriate and Ongoing  - Pt will demonstrate independence with the HEP at discharge. Appropriate and Ongoing  - sleep well.  Perform surgery without difficulty  Appropriate and Ongoing    Plan   Continue with established Plan of Care towards established PT goals.       Therapist: Kim Negron, PT  10/19/2023

## 2023-10-26 ENCOUNTER — CLINICAL SUPPORT (OUTPATIENT)
Dept: REHABILITATION | Facility: HOSPITAL | Age: 48
End: 2023-10-26
Payer: COMMERCIAL

## 2023-10-26 DIAGNOSIS — R29.898 IMPAIRED STRENGTH OF NECK MUSCLES: Primary | ICD-10-CM

## 2023-10-26 PROCEDURE — 97750 PHYSICAL PERFORMANCE TEST: CPT | Mod: 32 | Performed by: PHYSICAL MEDICINE & REHABILITATION

## 2023-10-26 NOTE — PROGRESS NOTES
[facility]      Name: Caroline Raphael MD  Clinic Number: [id number]    Therapy Diagnosis:   Encounter Diagnosis   Name Primary?    Impaired strength of neck muscles Yes       Physician: Sonal Reagan, *    Visit Date: 10/26/2023    Physician: Sonal Reagan, *     Physician Orders: PT Eval and Treat    Medical Diagnosis from Referral:   M54.2 (ICD-10-CM) - Neck pain   M47.812 (ICD-10-CM) - Spondylosis of cervical region without myelopathy or radiculopathy      Evaluation Date: 6/15/2023  Authorization Period Expiration: 6/15/23  Plan of Care Expiration: sent through 23  Reassessment Due:  23  Visit # / Visits authorized:        Time In: 230  Time Out: 330  Total Billable Time: 60 minutes  INSURANCE and OUTCOMES: Program Benefit Group with Cervical Outcomes (NDI and AQoL)      Precautions: standard     Pattern of pain determined: 1 REP     Subjective    Dr. Raphael reports continued neck stiffness at times, but better over all and stretches help manage symptoms.  Today he does have a head ache 4/10.   Better after visit and responding to retractions and sustained retraction prone on elbows.    Pain Location: neck  Occupation: ophthalmology Physician   Leisure: working out      Pts goals: prevention, strengthen neck, His goals are working with less pain, ROM of neck, and better sleep.     Objective       Range of Motion - MOVEMENT LOSS     ROM Loss 10/19/23   Flexion within functional limits   Extension within functional limits   Side bending Right within functional limits   Side bending Left within functional limits   Rotation Right within functional limits   Rotation Left within functional limits   Protraction within functional limits   Retraction  No loss, functional        Baseline Isometric Testing on Med X equipment: Testing administered by PT     Date of testin/15/23  ROM 18-90 deg   Max Peak Torque 208    Min Peak Torque 93    Flex/Ext  Ratio 2/1   % below normative data -50%         Midpoint Isometric Testing on Med X equipment: Testing administered by PT     Date of testin23  ROM  deg   Max Peak Torque 293   Min Peak Torque 200   Flex/Ext Ratio 2/1   % below normative data -25% with 62% gain in strength              Outcomes:  Initial score:  NDI 8/50 or 16% impaired  Visit 9 NDI 5/50 or 10%   Goal:  < 5/50 or < 10% impaired      Treatment    Caroline received the treatments listed below:      Caroline received neuromuscular education  to isolate and engage spinal stabilization musculature correctly for motor control and coordination to aid in function and posture for 10 minutes on the Medical Medx Machine.  Patient performed MedX dynamic exercise with emphasis on spinal muscular control using pacer throughout  active range of motion. Therapist assisted patient in achieving optimal exertion for neural reeducation and endurance training by using the  Fransico Exertion Rating scale, by instructing the patient to aim for mid range of exertion, performing 15-20 repetitions, slowly, correctly,and safely.           10/26/2023     5:41 PM   [facility]   Visit Number 14   VAS Pain Rating 4   Treadmill Time (in min.) 5 min   Retraction in Sitting 10   Retraction with Extension 10   Sidebending 2   Rotation 2   Scapular Retraction 10   Manual Therapy 5 mins.   Cervical Weight 156 lbs   Repetitions 19   Rating of Perceived Exertion 4   Ice - Z Lie (in min.) 5             therapeutic exercises to develop strength, endurance, ROM, and posture for 40 minutes including:     Neck retractions 10 reps  Neck retraction ext 10 with towel for support and rotation  Prone retraction taught on elbows with reduction of headache    Not performed due to time today ( patient had strict time line)  Thoracic extension over chair with full foam roll x 10 reps with therapist over pressure  Trap stretch 20 sec bilat  Lev scap stretch 20 sec bilat  Open books 10 reps  bilat  standing over head row 2 plates 15 reps inspire with handles  Bird dog, opposite arm and leg lift with rows with 8 lb weight 10 reps bilat  Bird dog rowing with 8 lbs weight   Black theraband bow and arrow 20  Prone neck extensions off mat edge for strengthening x 15 -NP  Supine neck and T spine ext off mat edge for stretching -NP  squat row with 2 plates 15 reps inspire with bar -NP  -lifts 2 plates 15 reps inspire bilat inspire with handles  -NP  Supine bridge with green theraband horizontal abduction 15 reps      Peripheral muscle strengthening which included 1 set of 15-20 repetitions at a slow, controlled 10-13 second per rep pace focused on strengthening supporting musculature for improved body mechanics and functional mobility.  Pt and therapist focused on proper form during treatment to ensure optimal strengthening of each targeted muscle group.  Machines were utilized including torso rotation, leg press, hip abd and hip add, leg ext.  Leg curl, triceps, biceps, chest and row added visit 3    manual therapy techniques: sub occipital release, manual traction x 7 min    cold pack for 5 minutes to neck     Home Exercises Provided and Patient Education Provided   Home exercises include:  Neck retractions 10 reps  Neck retraction ext 10 with towel for support and rotation  Thoracic extension over chair with 1/2 foam 10 reps with therapist over pressure  Open books 10 reps bilat  Supine thoracic stretch over bed edge  No money's with green band  Prone neck raises using gravity for resistance 10 reps  Cardio program: he tries to run when he has time  Lifting education date: 10/9/2023  Posture/Lumbar roll:  yes  Fridge Magnet Discharge handout (date given):  Equipment at home/gym membership:  belongs to gym      Education provided:   - headache positioning  -diaphragmatic breathing to reduce upper neck tension with shallow breathing    Written Home Exercises Provided: Patient instructed to cont prior HEP with  additions of no money, and prone head raises  Exercises were reviewed and Caroline was able to demonstrate them prior to the end of the session.  Caroline demonstrated good  understanding of the education provided.     See EMR under Patient Instructions for exercises provided 7/13/23        Assessment     Patient arrives today with head ache which responded to retractions sitting and prone on elbows for sustained retraction.  Continued with cervical mobility and postural exercises.  Performed  sub occipital release and manual traction of cervical region.  Encouraged extension to 18 only and not past on medx to see if he has any change as he reports slight headache after exercise.  We did this last visit, and he is unsure of effect, but did feel good after last visit.    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        Patient is making good progress towards established goals.  Pt will continue to benefit from skilled outpatient physical therapy to address the deficits stated in the impairment chart, provide pt/family education and to maximize pt's level of independence in the home and community environment.     Anticipated Barriers for therapy: nil  Pt's spiritual, cultural and educational needs considered and pt agreeable to plan of care and goals as stated below:     GOALS: Pt is in agreement with the following goals.     Short term goals: 6 weeks or 10 visits   - Pt will demonstratte increased cervical ROM as measured by med ex by 3 degrees from initial test which results in improved  ROM of neck for ease with ADLs and driving. MET 8/31/23  - Pt will demonstrate independence with reducing or controlling symptoms with ther ex, movement, or position independently, able to reduce pain 1-2 points on pain scale using strategies taught in therapy.  (MET 9/21/23)  - Pt will demonstrate increased MedX average isometric strength value by  15% with  when compared to the initial testing resulting in improved ability to perform bending, lifting, and carrying activities safely and confidently.  (MET 9/21/23)  Long term goals: 10 weeks or 20 visits  - Pt will demonstratte increased cervical ROM as measured by med ex by 8 degrees from initial test which results in functional ROM of neck for ease with ADLs and driving. MET 8/31/23  - Pt will demonstrate increased MedX average isometric strength value  by 40% from initial test to improve ability to lift and carry, and sustain good posture while performing ADL's.  (MET 9/21/23)  - Pt will demonstrate reduced pain and improved functional outcomes as reported on the Neck Disability Index by reaching a score of 5/50 or less in order to demonstrate subjective improvement in pt's condition.   (MET 9/21/23)  - Pt will demonstrate independence with reducing or controlling symptoms with ther ex, movement, or position independently, able to reduce pain 2-4 points on pain scale using strategies taught in therapy. Appropriate and Ongoing  - Pt will demonstrate independence with the HEP at discharge. Appropriate and Ongoing  - sleep well.  Perform surgery without difficulty  Appropriate and Ongoing    Plan   Continue with established Plan of Care towards established PT goals.       Therapist: Kim Negron, PT  10/26/2023

## 2023-11-02 ENCOUNTER — CLINICAL SUPPORT (OUTPATIENT)
Dept: REHABILITATION | Facility: HOSPITAL | Age: 48
End: 2023-11-02
Payer: COMMERCIAL

## 2023-11-02 DIAGNOSIS — R29.898 IMPAIRED STRENGTH OF NECK MUSCLES: Primary | ICD-10-CM

## 2023-11-02 PROCEDURE — 97750 PHYSICAL PERFORMANCE TEST: CPT | Mod: 32

## 2023-11-02 NOTE — PROGRESS NOTES
[facility]      Name: Caroline Raphael MD  Clinic Number: [id number]    Therapy Diagnosis:   Encounter Diagnosis   Name Primary?    Impaired strength of neck muscles Yes     Physician: Sonal Reagan, *    Visit Date: 2023     Physician Orders: PT Eval and Treat    Medical Diagnosis from Referral:   M54.2 (ICD-10-CM) - Neck pain   M47.812 (ICD-10-CM) - Spondylosis of cervical region without myelopathy or radiculopathy   Evaluation Date: 6/15/2023  Authorization Period Expiration: 2023  Plan of Care Expiration: sent through 23  Reassessment Due: 2023  Visit # / Visits authorized: 15/20       Time In: 2:35 PM  Time Out: 3:35 PM  Total Billable Time: 55 minutes  INSURANCE and OUTCOMES: Program Benefit Group with Cervical Outcomes (NDI and AQoL)      Precautions: standard     Pattern of pain determined: 1 REP     Subjective   Dr. Raphael reports continued neck stiffness at times, but better over all and stretches continue to help manage symptoms. No c/o neck pain or headache currently.     Pain level: 0/10  Pain Location: neck  Occupation: ophthalmology Physician   Leisure: working out      Pts goals: prevention, strengthen neck, His goals are working with less pain, ROM of neck, and better sleep.     Objective     Range of Motion - MOVEMENT LOSS     ROM Loss 10/19/23   Flexion within functional limits   Extension within functional limits   Side bending Right within functional limits   Side bending Left within functional limits   Rotation Right within functional limits   Rotation Left within functional limits   Protraction within functional limits   Retraction  No loss, functional        Baseline Isometric Testing on Med X equipment: Testing administered by PT     Date of testin/15/23  ROM 18-90 deg   Max Peak Torque 208    Min Peak Torque 93    Flex/Ext Ratio 2/1   % below normative data -50%       Midpoint Isometric Testing on Med X equipment: Testing  administered by PT     Date of testin23  ROM  deg   Max Peak Torque 293   Min Peak Torque 200   Flex/Ext Ratio 2/1   % below normative data -25% with 62% gain in strength              Outcomes:  Initial score:  NDI 8/50 or 16% impaired  Visit 9 NDI 5/50 or 10%   Goal:  < 5/50 or < 10% impaired      Treatment    Caroline received the treatments listed below:      Caroline received neuromuscular education  to isolate and engage spinal stabilization musculature correctly for motor control and coordination to aid in function and posture for 10 minutes on the Medical Medx Machine.  Patient performed MedX dynamic exercise with emphasis on spinal muscular control using pacer throughout  active range of motion. Therapist assisted patient in achieving optimal exertion for neural reeducation and endurance training by using the  Fransico Exertion Rating scale, by instructing the patient to aim for mid range of exertion, performing 15-20 repetitions, slowly, correctly,and safely.           2023     2:43 PM   [facility]   Visit Number 15   VAS Pain Rating 0   Treadmill Time (in min.) 5 min   Retraction in Sitting 10   Retraction with Extension 10   Sidebending 2   Rotation 2   Scapular Retraction 10   Cervical Weight 156 lbs   Repetitions 18   Rating of Perceived Exertion 7   Ice - Z Lie (in min.) 5      therapeutic exercises to develop strength, endurance, ROM, and posture for 45 minutes including:     Trap stretch 20 sec x 2 bilat  Lev scap stretch 20 sec x 2 bilat  Neck retractions 10 reps  Neck retraction ext 10 with towel for support and rotation  Prone retraction taught on elbows with reduction of headache  Thoracic extension over chair with full foam roll x 10 reps   Open books 10 reps bilat  Bird dog, opposite arm and leg lift with rows with 8 lb weight x20 bilateral  Prone neck extensions off mat edge for strengthening x 15     Not performed today:    Prone retraction taught on elbows with reduction of  headache  standing over head row 2 plates 15 reps inspire with handles  Bird dog, opposite arm and leg lift with rows with 8 lb weight 10 reps bilat  Black theraband bow and arrow 20  Supine neck and T spine ext off mat edge for stretching -NP  squat row with 2 plates 15 reps inspire with bar -NP  lifts 2 plates 15 reps inspire bilat inspire with handles  -NP  Supine bridge with green theraband horizontal abduction 15 reps      Peripheral muscle strengthening which included 1 set of 15-20 repetitions at a slow, controlled 10-13 second per rep pace focused on strengthening supporting musculature for improved body mechanics and functional mobility.  Pt and therapist focused on proper form during treatment to ensure optimal strengthening of each targeted muscle group.  Machines were utilized including torso rotation, leg press, hip abd and hip add, leg ext.  Leg curl, triceps, biceps, chest and row added visit 3    manual therapy techniques: sub occipital release, manual traction 00 min - NP    cold pack for 5 minutes to neck     Home Exercises Provided and Patient Education Provided   Home exercises include:  Neck retractions 10 reps  Neck retraction ext 10 with towel for support and rotation  Thoracic extension over chair with 1/2 foam 10 reps with therapist over pressure  Open books 10 reps bilat  Supine thoracic stretch over bed edge  No money's with green band  Prone neck raises using gravity for resistance 10 reps  Cardio program: he tries to run when he has time  Lifting education date: 10/9/2023  Posture/Lumbar roll:  yes  Fridge Magnet Discharge handout (date given):  Equipment at home/gym membership:  belongs to gym    Education provided:   - headache positioning  -diaphragmatic breathing to reduce upper neck tension with shallow breathing    Written Home Exercises Provided: Patient instructed to cont prior HEP with additions of no money, and prone head raises  Exercises were reviewed and Caroline was able to  demonstrate them prior to the end of the session.  Caroline demonstrated good  understanding of the education provided.     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 Treatment continued with cervical mobility, neuro re-education, and postural exercises. He was able to complete mat ex's without c/o increased symptoms. Cervical MedX resistance was maintained at 156 in/lbs with pt completing 18 reps with RPE = 7/10. Pt noted feeling more fatigue than usual today with MedX exercise. Slight increase in pain/stiffness reported following MedX exercise. Pt was able to complete full peripheral exercises without complaints. Will continue to progress per HB protocol and pt's tolerance.     Patient is making good progress towards established goals.  Pt will continue to benefit from skilled outpatient physical therapy to address the deficits stated in the impairment chart, provide pt/family education and to maximize pt's level of independence in the home and community environment.     Anticipated Barriers for therapy: nil  Pt's spiritual, cultural and educational needs considered and pt agreeable to plan of care and goals as stated below:     GOALS: Pt is in agreement with the following goals.     Short term goals: 6 weeks or 10 visits   - Pt will demonstratte increased cervical ROM as measured by med ex by 3 degrees from initial test which results in improved  ROM of neck for ease with ADLs and driving. MET 8/31/23  - Pt will demonstrate independence with reducing or controlling symptoms with ther ex, movement, or position independently, able to reduce pain 1-2 points on pain scale using strategies taught in therapy.  (MET 9/21/23)  - Pt will demonstrate increased MedX average isometric strength value by 15% with  when compared to the initial testing resulting in improved ability to perform bending, lifting, and carrying activities safely  and confidently.  (MET 9/21/23)  Long term goals: 10 weeks or 20 visits  - Pt will demonstratte increased cervical ROM as measured by med ex by 8 degrees from initial test which results in functional ROM of neck for ease with ADLs and driving. MET 8/31/23  - Pt will demonstrate increased MedX average isometric strength value  by 40% from initial test to improve ability to lift and carry, and sustain good posture while performing ADL's.  (MET 9/21/23)  - Pt will demonstrate reduced pain and improved functional outcomes as reported on the Neck Disability Index by reaching a score of 5/50 or less in order to demonstrate subjective improvement in pt's condition.   (MET 9/21/23)  - Pt will demonstrate independence with reducing or controlling symptoms with ther ex, movement, or position independently, able to reduce pain 2-4 points on pain scale using strategies taught in therapy. Appropriate and Ongoing  - Pt will demonstrate independence with the HEP at discharge. Appropriate and Ongoing  - sleep well.  Perform surgery without difficulty  Appropriate and Ongoing    Plan   Continue with established Plan of Care towards established PT goals.       Therapist: Dina Su, PT  11/3/2023

## 2023-11-06 ENCOUNTER — CLINICAL SUPPORT (OUTPATIENT)
Dept: REHABILITATION | Facility: HOSPITAL | Age: 48
End: 2023-11-06
Payer: COMMERCIAL

## 2023-11-06 DIAGNOSIS — R29.898 IMPAIRED STRENGTH OF NECK MUSCLES: Primary | ICD-10-CM

## 2023-11-06 PROCEDURE — 97750 PHYSICAL PERFORMANCE TEST: CPT | Mod: 32

## 2023-11-06 NOTE — PROGRESS NOTES
[facility]      Name: Caroline Raphael MD  Clinic Number: [id number]    Therapy Diagnosis:   Encounter Diagnosis   Name Primary?    Impaired strength of neck muscles Yes     Physician: Sonal Reagan, *    Visit Date: 2023     Physician Orders: PT Eval and Treat    Medical Diagnosis from Referral:   M54.2 (ICD-10-CM) - Neck pain   M47.812 (ICD-10-CM) - Spondylosis of cervical region without myelopathy or radiculopathy   Evaluation Date: 6/15/2023  Authorization Period Expiration: 2023  Plan of Care Expiration: sent through 23  Reassessment Due: 2023  Visit # / Visits authorized: 15/20       Time In: 2:25 PM  Time Out: 3:20 PM  Total Billable Time: 55 minutes  INSURANCE and OUTCOMES: Program Benefit Group with Cervical Outcomes (NDI and AQoL)      Precautions: standard     Pattern of pain determined: 1 REP     Subjective   Dr. Raphael denies neck pain at start of session, generally feeling that his neck is feeling better.  He reports more stiffness on days when he is doing surgery.   No c/o neck pain or headache currently.     Pain level: 0/10  Pain Location: neck  Occupation: ophthalmology Physician   Leisure: working out      Pts goals: prevention, strengthen neck, His goals are working with less pain, ROM of neck, and better sleep.     Objective     Range of Motion - MOVEMENT LOSS     ROM Loss 10/19/23   Flexion within functional limits   Extension within functional limits   Side bending Right within functional limits   Side bending Left within functional limits   Rotation Right within functional limits   Rotation Left within functional limits   Protraction within functional limits   Retraction  No loss, functional        Baseline Isometric Testing on Med X equipment: Testing administered by PT     Date of testin/15/23  ROM 18-90 deg   Max Peak Torque 208    Min Peak Torque 93    Flex/Ext Ratio 2/1   % below normative data -50%       Midpoint  Isometric Testing on Med X equipment: Testing administered by PT     Date of testin23  ROM  deg   Max Peak Torque 293   Min Peak Torque 200   Flex/Ext Ratio 2/1   % below normative data -25% with 62% gain in strength              Outcomes:  Initial score:  NDI 8/50 or 16% impaired  Visit 9 NDI 5/50 or 10%   Goal:  < 5/50 or < 10% impaired      Treatment    Caroline received the treatments listed below:      Caroline received neuromuscular education  to isolate and engage spinal stabilization musculature correctly for motor control and coordination to aid in function and posture for 10 minutes on the Medical Medx Machine.  Patient performed MedX dynamic exercise with emphasis on spinal muscular control using pacer throughout  active range of motion. Therapist assisted patient in achieving optimal exertion for neural reeducation and endurance training by using the  Fransico Exertion Rating scale, by instructing the patient to aim for mid range of exertion, performing 15-20 repetitions, slowly, correctly,and safely.           2023     3:41 PM   [facility] - [facility]   Visit Number 16   VAS Pain Rating 0   Treadmill Time (in min.) 5 min   Retraction in Sitting 10   Retraction with Extension 10   Sidebending 2   Rotation 2   Scapular Retraction 10   Cervical Weight 156 lbs   Repetitions 18   Rating of Perceived Exertion 6       therapeutic exercises to develop strength, endurance, ROM, and posture for 45 minutes including:     Trap stretch 20 sec x 2 bilat  Lev scap stretch 20 sec x 2 bilat  Neck retractions 10 reps NP  Neck retraction ext 10 with towel for support and rotation  Prone retraction taught on elbows with reduction of headache x 20  Thoracic extension over chair with full foam roll x 10 reps   Open books 10 reps bilat  Bird dog, opposite arm and leg lift with rows with 8 lb weight x20 bilateral  Prone neck extensions off mat edge for strengthening x 15   Paloff press on cable column 15# x 15    Prone lower trap strengthening (level 2) x 15    Not performed today:    standing over head row 2 plates 15 reps inspire with handles  Bird dog, opposite arm and leg lift with rows with 8 lb weight 10 reps bilat  Black theraband bow and arrow 20  Supine neck and T spine ext off mat edge for stretching -NP  squat row with 2 plates 15 reps inspire with bar -NP  lifts 2 plates 15 reps inspire bilat inspire with handles  -NP  Supine bridge with green theraband horizontal abduction 15 reps      Peripheral muscle strengthening which included 1 set of 15-20 repetitions at a slow, controlled 10-13 second per rep pace focused on strengthening supporting musculature for improved body mechanics and functional mobility.  Pt and therapist focused on proper form during treatment to ensure optimal strengthening of each targeted muscle group.  Machines were utilized including torso rotation, leg press, hip abd and hip add, leg ext.  Leg curl, triceps, biceps, chest and row added visit 3    manual therapy techniques: sub occipital release, manual traction 00 min - NP    cold pack for 5 minutes to neck     Home Exercises Provided and Patient Education Provided   Home exercises include:  Neck retractions 10 reps  Neck retraction ext 10 with towel for support and rotation  Thoracic extension over chair with 1/2 foam 10 reps with therapist over pressure  Open books 10 reps bilat  Supine thoracic stretch over bed edge  No money's with green band  Prone neck raises using gravity for resistance 10 reps  Cardio program: he tries to run when he has time  Lifting education date: 10/9/2023  Posture/Lumbar roll:  yes  Fridge Magnet Discharge handout (date given):  Equipment at home/gym membership:  belongs to gym    Education provided:   - headache positioning  -diaphragmatic breathing to reduce upper neck tension with shallow breathing    Written Home Exercises Provided: Patient instructed to cont prior HEP with additions of no money, and  prone head raises  Exercises were reviewed and Caroline was able to demonstrate them prior to the end of the session.  Caroline demonstrated good  understanding of the education provided.     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 Treatment continued with cervical mobility, neuro re-education, and postural exercises. He was able to complete mat ex's without c/o increased symptoms. Cervical MedX resistance was maintained at 156 in/lbs with pt completing 18 reps with RPE = 6/10.  Pt was able to complete full peripheral exercises without complaints. Will continue to progress per HB protocol and pt's tolerance.     Patient is making good progress towards established goals.  Pt will continue to benefit from skilled outpatient physical therapy to address the deficits stated in the impairment chart, provide pt/family education and to maximize pt's level of independence in the home and community environment.     Anticipated Barriers for therapy: nil  Pt's spiritual, cultural and educational needs considered and pt agreeable to plan of care and goals as stated below:     GOALS: Pt is in agreement with the following goals.     Short term goals: 6 weeks or 10 visits   - Pt will demonstratte increased cervical ROM as measured by med ex by 3 degrees from initial test which results in improved  ROM of neck for ease with ADLs and driving. MET 8/31/23  - Pt will demonstrate independence with reducing or controlling symptoms with ther ex, movement, or position independently, able to reduce pain 1-2 points on pain scale using strategies taught in therapy.  (MET 9/21/23)  - Pt will demonstrate increased MedX average isometric strength value by 15% with  when compared to the initial testing resulting in improved ability to perform bending, lifting, and carrying activities safely and confidently.  (MET 9/21/23)  Long term goals: 10 weeks or 20  visits  - Pt will demonstratte increased cervical ROM as measured by med ex by 8 degrees from initial test which results in functional ROM of neck for ease with ADLs and driving. MET 8/31/23  - Pt will demonstrate increased MedX average isometric strength value  by 40% from initial test to improve ability to lift and carry, and sustain good posture while performing ADL's.  (MET 9/21/23)  - Pt will demonstrate reduced pain and improved functional outcomes as reported on the Neck Disability Index by reaching a score of 5/50 or less in order to demonstrate subjective improvement in pt's condition.   (MET 9/21/23)  - Pt will demonstrate independence with reducing or controlling symptoms with ther ex, movement, or position independently, able to reduce pain 2-4 points on pain scale using strategies taught in therapy. Appropriate and Ongoing  - Pt will demonstrate independence with the HEP at discharge. Appropriate and Ongoing  - sleep well.  Perform surgery without difficulty  Appropriate and Ongoing    Plan   Continue with established Plan of Care towards established PT goals.       Therapist: Albertina Hastings, PT  11/6/2023

## 2023-11-16 ENCOUNTER — CLINICAL SUPPORT (OUTPATIENT)
Dept: REHABILITATION | Facility: HOSPITAL | Age: 48
End: 2023-11-16
Payer: COMMERCIAL

## 2023-11-16 DIAGNOSIS — R29.898 IMPAIRED STRENGTH OF NECK MUSCLES: Primary | ICD-10-CM

## 2023-11-16 PROCEDURE — 97750 PHYSICAL PERFORMANCE TEST: CPT | Mod: 32

## 2023-11-16 NOTE — PROGRESS NOTES
[facility]      Name: Caroline Raphael MD  Clinic Number: [id number]    Therapy Diagnosis:   Encounter Diagnosis   Name Primary?    Impaired strength of neck muscles Yes     Physician: Sonal Reagan, *    Visit Date: 2023     Physician Orders: PT Eval and Treat    Medical Diagnosis from Referral:   M54.2 (ICD-10-CM) - Neck pain   M47.812 (ICD-10-CM) - Spondylosis of cervical region without myelopathy or radiculopathy   Evaluation Date: 6/15/2023  Authorization Period Expiration: 2023  Plan of Care Expiration: sent through 23  Reassessment Due: 2023  Visit # / Visits authorized:        Time In: 2:40 PM  Time Out: 3:35 PM  Total Billable Time: 55 minutes  INSURANCE and OUTCOMES: Program Benefit Group with Cervical Outcomes (NDI and AQoL)      Precautions: standard     Pattern of pain determined: 1 REP     Subjective   Dr. 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     Pain level: 0/10  Pain Location: neck  Occupation: ophthalmology Physician   Leisure: working out      Pts goals: prevention, strengthen neck, His goals are working with less pain, ROM of neck, and better sleep.     Objective     Range of Motion - MOVEMENT LOSS     ROM Loss 10/19/23   Flexion within functional limits   Extension within functional limits   Side bending Right within functional limits   Side bending Left within functional limits   Rotation Right within functional limits   Rotation Left within functional limits   Protraction within functional limits   Retraction  No loss, functional      Baseline Isometric Testing on Med X equipment: Testing administered by PT     Date of testin/15/23  ROM 18-90 deg   Max Peak Torque 208    Min Peak Torque 93    Flex/Ext Ratio 2/1   % below normative data -50%       Midpoint Isometric Testing on Med X equipment: Testing administered by PT     Date of testin23  ROM  deg   Max  Peak Torque 293   Min Peak Torque 200   Flex/Ext Ratio 2/1   % below normative data -25% with 62% gain in strength              Outcomes:  Initial score:  NDI 8/50 or 16% impaired  Visit 9 NDI 5/50 or 10%   Goal:  < 5/50 or < 10% impaired      Treatment    Caroline received the treatments listed below:      Caroline received neuromuscular education  to isolate and engage spinal stabilization musculature correctly for motor control and coordination to aid in function and posture for 10 minutes on the Medical Medx Machine.  Patient performed MedX dynamic exercise with emphasis on spinal muscular control using pacer throughout  active range of motion. Therapist assisted patient in achieving optimal exertion for neural reeducation and endurance training by using the  Fransico Exertion Rating scale, by instructing the patient to aim for mid range of exertion, performing 15-20 repetitions, slowly, correctly,and safely.           11/16/2023     3:48 PM   [facility]   Visit Number 17   VAS Pain Rating 0   Sidebending 2   Rotation 2   Scapular Retraction 10   Cervical Weight 156 lbs   Repetitions 20   Rating of Perceived Exertion 5     therapeutic exercises to develop strength, endurance, ROM, and posture for 45 minutes including:     Trap stretch 20 sec x 2 bilat  Lev scap stretch 20 sec x 2 bilat  Neck retractions 10 reps - NP  Neck retraction ext 10 with towel for support and rotation  Prone retraction taught on elbows with reduction of headache x 20  Thoracic extension over chair with full foam roll x 10 reps   Open books 10 reps bilat  Bird dog, opposite arm and leg lift with rows with 10 lb weight x20 bilateral  Prone neck extensions off mat edge for strengthening x20   Paloff press on cable column 15# x20  Prone lower trap strengthening (level 2) x 15    Not performed today:    standing over head row 2 plates 15 reps inspire with handles  Bird dog, opposite arm and leg lift with rows with 8 lb weight 10 reps  bilat  Black theraband bow and arrow 20  Supine neck and T spine ext off mat edge for stretching -NP  squat row with 2 plates 15 reps inspire with bar -NP  lifts 2 plates 15 reps inspire bilat inspire with handles  -NP  Supine bridge with green theraband horizontal abduction 15 reps      Peripheral muscle strengthening which included 1 set of 15-20 repetitions at a slow, controlled 10-13 second per rep pace focused on strengthening supporting musculature for improved body mechanics and functional mobility.  Pt and therapist focused on proper form during treatment to ensure optimal strengthening of each targeted muscle group.  Machines were utilized including torso rotation, leg press, hip abd and hip add, leg ext.  Leg curl, triceps, biceps, chest and row added visit 3    manual therapy techniques: sub occipital release, manual traction 00 min - NP    cold pack for 5 minutes to neck     Home Exercises Provided and Patient Education Provided   Home exercises include:  Neck retractions 10 reps  Neck retraction ext 10 with towel for support and rotation  Thoracic extension over chair with 1/2 foam 10 reps with therapist over pressure  Open books 10 reps bilat  Supine thoracic stretch over bed edge  No money's with green band  Prone neck raises using gravity for resistance 10 reps  Cardio program: he tries to run when he has time  Lifting education date: 10/9/2023  Posture/Lumbar roll:  yes  Fridge Magnet Discharge handout (date given):  Equipment at home/gym membership:  belongs to gym    Education provided:   - headache positioning  -diaphragmatic breathing to reduce upper neck tension with shallow breathing    Written Home Exercises Provided: Patient instructed to cont prior HEP with additions of no money, and prone head raises  Exercises were reviewed and Caroline was able to demonstrate them prior to the end of the session.  Caroline demonstrated good  understanding of the education provided.     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 Treatment continued with cervical mobility, neuro re-education, and postural exercises. Progressed reps for prone neck extensions and paloff press at cable column. Progressed to 10# DB with bird dog + rows. He was able to complete mat ex's without c/o increased symptoms. Cervical MedX resistance was maintained at 156 in/lbs with pt completing 20 reps with RPE = 5/10.  Pt was able to complete full peripheral exercises without complaints. Will continue to progress per HB protocol and pt's tolerance.     Patient is making good progress towards established goals.  Pt will continue to benefit from skilled outpatient physical therapy to address the deficits stated in the impairment chart, provide pt/family education and to maximize pt's level of independence in the home and community environment.     Anticipated Barriers for therapy: nil  Pt's spiritual, cultural and educational needs considered and pt agreeable to plan of care and goals as stated below:     GOALS: Pt is in agreement with the following goals.     Short term goals: 6 weeks or 10 visits   - Pt will demonstratte increased cervical ROM as measured by med ex by 3 degrees from initial test which results in improved  ROM of neck for ease with ADLs and driving. MET 8/31/23  - Pt will demonstrate independence with reducing or controlling symptoms with ther ex, movement, or position independently, able to reduce pain 1-2 points on pain scale using strategies taught in therapy.  (MET 9/21/23)  - Pt will demonstrate increased MedX average isometric strength value by 15% with  when compared to the initial testing resulting in improved ability to perform bending, lifting, and carrying activities safely and confidently.  (MET 9/21/23)  Long term goals: 10 weeks or 20 visits  - Pt will demonstratte increased cervical ROM as measured by med ex by 8 degrees from  initial test which results in functional ROM of neck for ease with ADLs and driving. MET 8/31/23  - Pt will demonstrate increased MedX average isometric strength value  by 40% from initial test to improve ability to lift and carry, and sustain good posture while performing ADL's.  (MET 9/21/23)  - Pt will demonstrate reduced pain and improved functional outcomes as reported on the Neck Disability Index by reaching a score of 5/50 or less in order to demonstrate subjective improvement in pt's condition.   (MET 9/21/23)  - Pt will demonstrate independence with reducing or controlling symptoms with ther ex, movement, or position independently, able to reduce pain 2-4 points on pain scale using strategies taught in therapy. Appropriate and Ongoing  - Pt will demonstrate independence with the HEP at discharge. Appropriate and Ongoing  - sleep well.  Perform surgery without difficulty  Appropriate and Ongoing    Plan   Continue with established Plan of Care towards established PT goals.       Therapist: Dina Su, PT  11/17/2023

## 2023-11-30 ENCOUNTER — CLINICAL SUPPORT (OUTPATIENT)
Dept: REHABILITATION | Facility: HOSPITAL | Age: 48
End: 2023-11-30
Payer: COMMERCIAL

## 2023-11-30 DIAGNOSIS — R29.898 IMPAIRED STRENGTH OF NECK MUSCLES: Primary | ICD-10-CM

## 2023-11-30 PROCEDURE — 97750 PHYSICAL PERFORMANCE TEST: CPT | Mod: 32 | Performed by: PHYSICAL MEDICINE & REHABILITATION

## 2023-11-30 NOTE — PROGRESS NOTES
[facility]      Name: Caroline Raphael MD  Clinic Number: [id number]    Therapy Diagnosis:   Encounter Diagnosis   Name Primary?    Impaired strength of neck muscles Yes     Physician: Sonal Reagan, *    Visit Date: 2023     Physician Orders: PT Eval and Treat    Medical Diagnosis from Referral:   M54.2 (ICD-10-CM) - Neck pain   M47.812 (ICD-10-CM) - Spondylosis of cervical region without myelopathy or radiculopathy   Evaluation Date: 6/15/2023  Authorization Period Expiration: 2023  Plan of Care Expiration: sent through 23  Reassessment Due: 2023  Visit # / Visits authorized:        Time In: 3:30 pm  Time Out: 4:35 PM  Total Billable Time: 55 minutes  INSURANCE and OUTCOMES: Program Benefit Group with Cervical Outcomes (NDI and AQoL)      Precautions: standard     Pattern of pain determined: 1 REP     Subjective   Dr. 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 He does want to do wellness at graduation to maintain strength.    Pain level: 0/10  Pain Location: neck  Occupation: ophthalmology Physician   Leisure: working out      Pts goals: prevention, strengthen neck, His goals are working with less pain, ROM of neck, and better sleep.     Objective     Range of Motion - MOVEMENT LOSS     ROM Loss 23   Flexion within functional limits   Extension within functional limits   Side bending Right within functional limits   Side bending Left within functional limits   Rotation Right within functional limits   Rotation Left within functional limits   Protraction within functional limits   Retraction  No loss, functional      Baseline Isometric Testing on Med X equipment: Testing administered by PT     Date of testin/15/23  ROM 18-90 deg   Max Peak Torque 208    Min Peak Torque 93    Flex/Ext Ratio 2/1   % below normative data -50%       Midpoint Isometric Testing on Med X equipment: Testing  administered by PT     Date of testin23  ROM  deg   Max Peak Torque 293   Min Peak Torque 200   Flex/Ext Ratio 2/1   % below normative data -25% with 62% gain in strength              Outcomes:  Initial score:  NDI 8/50 or 16% impaired  Visit 9 NDI 5/50 or 10%   Goal:  < 5/50 or < 10% impaired      Treatment    Caroline received the treatments listed below:      Caroline received neuromuscular education  to isolate and engage spinal stabilization musculature correctly for motor control and coordination to aid in function and posture for 10 minutes on the Medical Medx Machine.  Patient performed MedX dynamic exercise with emphasis on spinal muscular control using pacer throughout  active range of motion. Therapist assisted patient in achieving optimal exertion for neural reeducation and endurance training by using the  Fransico Exertion Rating scale, by instructing the patient to aim for mid range of exertion, performing 15-20 repetitions, slowly, correctly,and safely.           2023     4:36 PM   [facility]   Visit Number 18   VAS Pain Rating 0   Treadmill Time (in min.) 6 min   Retraction in Sitting 10   Retraction with Extension 10   Sidebending 2   Rotation 2   Scapular Retraction 10   Manual Therapy 5 mins.   Cervical Weight 156 lbs   Repetitions 18   Rating of Perceived Exertion 5        therapeutic exercises to develop strength, endurance, ROM, and posture for 45 minutes including:     Trap stretch 20 sec x 2 bilat -NP  Lev scap stretch 20 sec x 2 bilat  -NP  Neck retractions 10 reps   Neck retraction ext 10 with towel for support and rotation  Prone retraction on elbows with reduction of headache x 20  Thoracic extension over chair with full foam roll x 10 reps   Open books 10 reps bilat  Taught dead bug core work with ball on abdomin and arm leg press into ball with opposite arm leg out, for abdominal work in gym without irritation to neck  Prone neck lifts for neck strengthening after  discharge      Not performed today:   Bird dog, opposite arm and leg lift with rows with 10 lb weight x20 bilateral  Prone neck extensions off mat edge for strengthening x20   Paloff press on cable column 15# x20  Prone lower trap strengthening (level 2) x 15  standing over head row 2 plates 15 reps inspire with handles  Bird dog, opposite arm and leg lift with rows with 8 lb weight 10 reps bilat  Black theraband bow and arrow 20  Supine neck and T spine ext off mat edge for stretching -NP  squat row with 2 plates 15 reps inspire with bar -NP  lifts 2 plates 15 reps inspire bilat inspire with handles  -NP  Supine bridge with green theraband horizontal abduction 15 reps      Peripheral muscle strengthening which included 1 set of 15-20 repetitions at a slow, controlled 10-13 second per rep pace focused on strengthening supporting musculature for improved body mechanics and functional mobility.  Pt and therapist focused on proper form during treatment to ensure optimal strengthening of each targeted muscle group.  Machines were utilized including torso rotation, leg press, hip abd and hip add, leg ext.  Leg curl, triceps, biceps, chest and row added visit 3    manual therapy techniques: sub occipital release, manual traction 00 min - NP    cold pack for 5 minutes to neck     Home Exercises Provided and Patient Education Provided   Home exercises include:  Neck retractions 10 reps  Neck retraction ext 10 with towel for support and rotation  Thoracic extension over chair with 1/2 foam 10 reps with therapist over pressure  Open books 10 reps bilat  Supine thoracic stretch over bed edge  No money's with green band  Prone neck raises using gravity for resistance 10 reps  Cardio program: he tries to run when he has time  Lifting education date: 10/9/2023  Posture/Lumbar roll:  yes  Fridge Magnet Discharge handout (date given):  Equipment at home/gym membership:  belongs to gym    Education provided:   - headache  positioning  -diaphragmatic breathing to reduce upper neck tension with shallow breathing  -wellness discussed and he wants to attend after graduation    Written Home Exercises Provided: Patient instructed to cont prior HEP with additions of no money, and prone head raises  Exercises were reviewed and Pattin was able to demonstrate them prior to the end of the session.  Caroline demonstrated good  understanding of the education provided.     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 Treatment continued with cervical mobility, neuro re-education, and postural exercises.  Discussed wellness and he does want to attend.  Discussed prone neck raises for maintaining strength at discharge, and also taught core work with neck on pillow to avoid neck irritation with core work.   Maintained weights as it has been 2 weeks since our last visit.   Cervical MedX resistance was maintained at 156 in/lbs with pt completing 15 reps with RPE = 5/10.  Pt was able to complete full peripheral exercises without complaints. Will continue to progress per HB protocol and pt's tolerance. Provide fridge magnet, go over HEP and get ready  for wellness over next 2 visits.    Patient is making good progress towards established goals.  Pt will continue to benefit from skilled outpatient physical therapy to address the deficits stated in the impairment chart, provide pt/family education and to maximize pt's level of independence in the home and community environment.     Anticipated Barriers for therapy: nil  Pt's spiritual, cultural and educational needs considered and pt agreeable to plan of care and goals as stated below:     GOALS: Pt is in agreement with the following goals.     Short term goals: 6 weeks or 10 visits   - Pt will demonstratte increased cervical ROM as measured by med ex by 3 degrees from initial test which results in improved  ROM of neck  for ease with ADLs and driving. MET 8/31/23  - Pt will demonstrate independence with reducing or controlling symptoms with ther ex, movement, or position independently, able to reduce pain 1-2 points on pain scale using strategies taught in therapy.  (MET 9/21/23)  - Pt will demonstrate increased MedX average isometric strength value by 15% with  when compared to the initial testing resulting in improved ability to perform bending, lifting, and carrying activities safely and confidently.  (MET 9/21/23)  Long term goals: 10 weeks or 20 visits  - Pt will demonstratte increased cervical ROM as measured by med ex by 8 degrees from initial test which results in functional ROM of neck for ease with ADLs and driving. MET 8/31/23  - Pt will demonstrate increased MedX average isometric strength value  by 40% from initial test to improve ability to lift and carry, and sustain good posture while performing ADL's.  (MET 9/21/23)  - Pt will demonstrate reduced pain and improved functional outcomes as reported on the Neck Disability Index by reaching a score of 5/50 or less in order to demonstrate subjective improvement in pt's condition.   (MET 9/21/23)  - Pt will demonstrate independence with reducing or controlling symptoms with ther ex, movement, or position independently, able to reduce pain 2-4 points on pain scale using strategies taught in therapy. Appropriate and Ongoing  - Pt will demonstrate independence with the HEP at discharge. Appropriate and Ongoing  - sleep well.  Perform surgery without difficulty  Appropriate and Ongoing    Plan   Continue with established Plan of Care towards established PT goals.       Therapist: Kim Negron, PT  11/30/2023

## 2023-12-11 ENCOUNTER — CLINICAL SUPPORT (OUTPATIENT)
Dept: REHABILITATION | Facility: HOSPITAL | Age: 48
End: 2023-12-11
Payer: COMMERCIAL

## 2023-12-11 DIAGNOSIS — R29.898 IMPAIRED STRENGTH OF NECK MUSCLES: Primary | ICD-10-CM

## 2023-12-11 PROCEDURE — 97750 PHYSICAL PERFORMANCE TEST: CPT | Mod: 32

## 2023-12-11 NOTE — PATIENT INSTRUCTIONS
"HEALTHY BACK TOOLS        KEEP YOUR SPINE FEELING FINE   HEALTHY HABITS   Do your "GO TO" stretches 2/day   Get a good night's REST   Watch your POSTURE in sitting/standing Drink PLENTY of water   Use a lumbar roll Eat LOTS of fruits & vegetables   GET UP often (walk and/or stretch) Manage your STRESS   Make your workplace IDEAL FOR YOU  Don't smoke   Lift correctly EXERCISE   Practice positive self talk-talk to yourself kindly like you would your best friend                         WHAT TO DO WHEN SYMPTOMS FLARE UP  Back and neck pain may occasionally flare up.  If you experience a flare   up, remember your tools. Be encouraged, by remembering that flare-ups will   usually pass.   My Tools:    ~Use your "Go To" Stretches/Positions   ~Keep Moving-pain usually gets better if you move  ~Z lie (with or without ice)  10 min several times a day until symptoms reduce  ~Slowly resume normal activities   ~Practice Deep Breathing and Relaxation techniques                                                 MY EXERCISE PLAN  GO TO STRETCHES  2/day (like brushing your teeth) STRENGTHENING  2-3 times/week CARDIO PROGRAM  20 min 4x/week   Cervical retractions Prone on elbow cervical retractions running   Upper trap stretch Prone neck extension    Levator scap stretch Bird dog with 10# row    Open book stretch Dead bug with big therapy ball     Paloff Press with silver band       "

## 2023-12-11 NOTE — PROGRESS NOTES
[facility]      Name: Caroline Raphael MD  Clinic Number: [id number]    Therapy Diagnosis:     Physician: Sonal Reagan, *    Visit Date: 2023       Physician Orders: PT Eval and Treat    Medical Diagnosis from Referral:   M54.2 (ICD-10-CM) - Neck pain   M47.812 (ICD-10-CM) - Spondylosis of cervical region without myelopathy or radiculopathy   Evaluation Date: 6/15/2023  Authorization Period Expiration: 2023  Plan of Care Expiration: sent through 23  Reassessment Due: 2023  Visit # / Visits authorized:        Time In: 3:20 pm  Time Out: 4:15 PM  Total Billable Time: 55 minutes  INSURANCE and OUTCOMES: Program Benefit Group with Cervical Outcomes (NDI and AQoL)      Precautions: standard     Pattern of pain determined: 1 REP     Subjective   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 He does want to do wellness at graduation to maintain strength.    Pain level: 0/10  Pain Location: neck  Occupation: ophthalmology Physician   Leisure: working out      Pts goals: prevention, strengthen neck, His goals are working with less pain, ROM of neck, and better sleep.     Objective     Range of Motion - MOVEMENT LOSS     ROM Loss 23   Flexion within functional limits   Extension within functional limits   Side bending Right within functional limits   Side bending Left within functional limits   Rotation Right within functional limits   Rotation Left within functional limits   Protraction within functional limits   Retraction  No loss, functional      Baseline Isometric Testing on Med X equipment: Testing administered by PT     Date of testin/15/23  ROM 18-90 deg   Max Peak Torque 208    Min Peak Torque 93    Flex/Ext Ratio 2/1   % below normative data -50%       Midpoint Isometric Testing on Med X equipment: Testing administered by PT      Date of testin23  ROM  deg   Max Peak Torque 293   Min Peak Torque 200   Flex/Ext Ratio 2/1   % below normative data -25% with 62% gain in strength              Outcomes:  Initial score:  NDI 8/50 or 16% impaired  Visit 9 NDI 5/50 or 10%   Goal:  < 5/50 or < 10% impaired      Treatment    Caroline received the treatments listed below:      Caroline received neuromuscular education  to isolate and engage spinal stabilization musculature correctly for motor control and coordination to aid in function and posture for 10 minutes on the Medical Medx Machine.  Patient performed MedX dynamic exercise with emphasis on spinal muscular control using pacer throughout  active range of motion. Therapist assisted patient in achieving optimal exertion for neural reeducation and endurance training by using the  Fransico Exertion Rating scale, by instructing the patient to aim for mid range of exertion, performing 15-20 repetitions, slowly, correctly,and safely.           2023     4:38 PM   [facility]   Visit Number 19   VAS Pain Rating 0   Treadmill Time (in min.) 5 min   Retraction in Sitting 10   Retraction with Extension 10   Sidebending 2   Rotation 2   Scapular Retraction 10   Cervical Weight 156 lbs   Repetitions 20   Rating of Perceived Exertion 6        therapeutic exercises to develop strength, endurance, ROM, and posture for 45 minutes including:     Trap stretch 20 sec x 2 bilat -NP  Lev scap stretch 20 sec x 2 bilat  -NP  Neck retractions 10 reps   Neck retraction ext 10 with towel for support and rotation  Prone retraction on elbows with reduction of headache x 20  Thoracic extension over chair with full foam roll x 10 reps   Open books 10 reps bilat  Taught dead bug core work with ball on abdomin and arm leg press into ball with opposite arm leg out, for abdominal work in gym without irritation to neck  Prone neck lifts for neck strengthening after discharge  Paloff press on cable column 15#  x20  Low to high chop on cable column 15# x 15    Not performed today:   Bird dog, opposite arm and leg lift with rows with 10 lb weight x20 bilateral  Prone neck extensions off mat edge for strengthening x20   Prone lower trap strengthening (level 2) x 15  standing over head row 2 plates 15 reps inspire with handles  Bird dog, opposite arm and leg lift with rows with 8 lb weight 10 reps bilat  Black theraband bow and arrow 20  Supine neck and T spine ext off mat edge for stretching -NP  squat row with 2 plates 15 reps inspire with bar -NP  lifts 2 plates 15 reps inspire bilat inspire with handles  -NP  Supine bridge with green theraband horizontal abduction 15 reps      Peripheral muscle strengthening which included 1 set of 15-20 repetitions at a slow, controlled 10-13 second per rep pace focused on strengthening supporting musculature for improved body mechanics and functional mobility.  Pt and therapist focused on proper form during treatment to ensure optimal strengthening of each targeted muscle group.  Machines were utilized including torso rotation, leg press, hip abd and hip add, leg ext.  Leg curl, triceps, biceps, chest and row added visit 3    manual therapy techniques: sub occipital release, manual traction 00 min - NP    cold pack for 5 minutes to neck     Home Exercises Provided and Patient Education Provided   Home exercises include:  Neck retractions 10 reps  Neck retraction ext 10 with towel for support and rotation  Thoracic extension over chair with 1/2 foam 10 reps with therapist over pressure  Open books 10 reps bilat  Supine thoracic stretch over bed edge  No money's with green band  Prone neck raises using gravity for resistance 10 reps  Cardio program: he tries to run when he has time  Lifting education date: 10/9/2023  Posture/Lumbar roll:  yes  Fridge [employer]et Discharge handout (date given): 12/11/2023  Equipment at home/gym membership:  belongs to gym    Education provided:   - headache  positioning  -diaphragmatic breathing to reduce upper neck tension with shallow breathing  -wellness discussed and he wants to attend after graduation    Written Home Exercises Provided: Patient instructed to cont prior HEP with additions of no money, and prone head raises  Exercises were reviewed and Caroline was able to demonstrate them prior to the end of the session.  Caroline demonstrated good  understanding of the education provided.     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 Reviewed discharge home exercise program with Dr. Raphael and issued Fridge Magnet form. Treatment continued with cervical mobility, neuro re-education, and postural exercises.  Maintained weight on Cervical MedX due to decreased repetition and increased perceived exertion last session.   Cervical MedX resistance was maintained at 156 in/lbs with pt completing 20 reps with RPE = 6/10.  Pt was able to complete full peripheral exercises without complaints. Will continue to progress per HB protocol and pt's tolerance. Schedule first wellness.    Patient is making good progress towards established goals.  Pt will continue to benefit from skilled outpatient physical therapy to address the deficits stated in the impairment chart, provide pt/family education and to maximize pt's level of independence in the home and community environment.     Anticipated Barriers for therapy: nil  Pt's spiritual, cultural and educational needs considered and pt agreeable to plan of care and goals as stated below:     GOALS: Pt is in agreement with the following goals.     Short term goals: 6 weeks or 10 visits   - Pt will demonstratte increased cervical ROM as measured by med ex by 3 degrees from initial test which results in improved  ROM of neck for ease with ADLs and driving. MET 8/31/23  - Pt will demonstrate independence with reducing or controlling symptoms with ther ex,  movement, or position independently, able to reduce pain 1-2 points on pain scale using strategies taught in therapy.  (MET 9/21/23)  - Pt will demonstrate increased MedX average isometric strength value by 15% with  when compared to the initial testing resulting in improved ability to perform bending, lifting, and carrying activities safely and confidently.  (MET 9/21/23)  Long term goals: 10 weeks or 20 visits  - Pt will demonstratte increased cervical ROM as measured by med ex by 8 degrees from initial test which results in functional ROM of neck for ease with ADLs and driving. MET 8/31/23  - Pt will demonstrate increased MedX average isometric strength value  by 40% from initial test to improve ability to lift and carry, and sustain good posture while performing ADL's.  (MET 9/21/23)  - Pt will demonstrate reduced pain and improved functional outcomes as reported on the Neck Disability Index by reaching a score of 5/50 or less in order to demonstrate subjective improvement in pt's condition.   (MET 9/21/23)  - Pt will demonstrate independence with reducing or controlling symptoms with ther ex, movement, or position independently, able to reduce pain 2-4 points on pain scale using strategies taught in therapy. Appropriate and Ongoing  - Pt will demonstrate independence with the HEP at discharge. Appropriate and Ongoing  - sleep well.  Perform surgery without difficulty  Appropriate and Ongoing    Plan   Continue with established Plan of Care towards established PT goals.       Therapist: Albertina Hastings,PT  12/11/2023

## 2023-12-14 ENCOUNTER — CLINICAL SUPPORT (OUTPATIENT)
Dept: REHABILITATION | Facility: HOSPITAL | Age: 48
End: 2023-12-14
Payer: COMMERCIAL

## 2023-12-14 DIAGNOSIS — R29.898 IMPAIRED STRENGTH OF NECK MUSCLES: Primary | ICD-10-CM

## 2023-12-14 PROCEDURE — 97750 PHYSICAL PERFORMANCE TEST: CPT | Mod: 32 | Performed by: PHYSICAL MEDICINE & REHABILITATION

## 2023-12-14 NOTE — PROGRESS NOTES
[facility]      Name: Caroline Raphael MD  Clinic Number: [id number]    Physician: Sonal Reagan, *    Visit Date: 2023       Physician Orders: PT Eval and Treat    Medical Diagnosis from Referral:   M54.2 (ICD-10-CM) - Neck pain   M47.812 (ICD-10-CM) - Spondylosis of cervical region without myelopathy or radiculopathy   Evaluation Date: 6/15/2023  Authorization Period Expiration: 2023  Plan of Care Expiration: sent through 23  Reassessment Done 23  Visit # / Visits authorized:        Time In: 3:20 pm  Time Out: 4:20 PM  Total Billable Time: 55 minutes  INSURANCE and OUTCOMES: Program Benefit Group with Cervical Outcomes (NDI and AQoL)      Precautions: standard     Pattern of pain determined: 1 REP     Subjective   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 He does want to do wellness at graduation to maintain strength.    Pain level: 0/10  Pain Location: neck  Occupation: ophthalmology Physician   Leisure: working out      Pts goals: prevention, strengthen neck, His goals are working with less pain, ROM of neck, and better sleep.     Objective     Range of Motion - MOVEMENT LOSS     ROM Loss 23   Flexion within functional limits   Extension within functional limits   Side bending Right within functional limits   Side bending Left within functional limits   Rotation Right within functional limits   Rotation Left within functional limits   Protraction within functional limits   Retraction  No loss, functional      Baseline Isometric Testing on Med X equipment: Testing administered by PT     Date of testin/15/23  ROM 18-90 deg   Max Peak Torque 208    Min Peak Torque 93    Flex/Ext Ratio 2/1   % below normative data -50%       Midpoint Isometric Testing on Med X equipment: Testing administered by PT     Date of testing:  23  ROM  deg   Max Peak Torque 293   Min Peak Torque 200   Flex/Ext Ratio 2/1   % below normative data -25% with 62% gain in strength        Final Isometric Testing on Med X equipment: Testing administered by PT     Date of testin23  ROM  deg   Max Peak Torque 292   Min Peak Torque 218   Flex/Ext Ratio 1.4/1   % below normative data -25% with 56% gain in strength              Outcomes:  Initial score:  NDI 8/50 or 16% impaired  Visit  20 NDI 3/50 or  6%   Goal:  < 5/50 or < 10% impaired  -MET  23      Treatment    Caroline received the treatments listed below:      Caroline received neuromuscular education for testing today with safe procedure explained for isolating and engaging spinal musculature.  He performed test  correctly for motor control and coordination with practice exercise and isometric test with no difficulties.            therapeutic exercises to develop strength, endurance, ROM, and posture for 45 minutes including:     Review:   MY EXERCISE PLAN  GO TO STRETCHES  2/day (like brushing your teeth) STRENGTHENING  2-3 times/week CARDIO PROGRAM  20 min 4x/week   Cervical retractions, neck extension, thoracic extension Prone on elbow cervical retractions running   Upper trap stretch Prone neck extension    Levator scap stretch Bird dog with 10# row    Open book stretch Dead bug with big therapy ball    Headaches-prone chin on hands or tennis ball relief Paloff Press with silver band          2023     5:34 PM   [facility]   Visit Number 20   VAS Pain Rating 0   Treadmill Time (in min.) 10 min   Retraction in Sitting 10   Retraction with Extension 10   Sidebending 2   Rotation 2   Scapular Retraction 10   Cervical Flexion 108   Cervical Extension 18   Cervical Peak Torque 292 ft. lbs.        Peripheral muscle strengthening which included 1 set of 15-20 repetitions at a slow, controlled 10-13 second per rep pace focused on strengthening supporting musculature for  improved body mechanics and functional mobility.  Pt and therapist focused on proper form during treatment to ensure optimal strengthening of each targeted muscle group.  Machines were utilized including torso rotation, leg press, hip abd and hip add, leg ext.  Leg curl, triceps, biceps, chest and row added visit 3    manual therapy techniques: sub occipital release, manual traction 00 min - NP    cold pack for 5 minutes to neck     Home Exercises Provided and Patient Education Provided   Home exercises include:  Neck retractions 10 reps  Neck retraction ext 10 with towel for support and rotation  Thoracic extension over chair with 1/2 foam 10 reps with therapist over pressure  Open books 10 reps bilat  Supine thoracic stretch over bed edge  No money's with green band  Prone neck raises using gravity for resistance 10 reps  Cardio program: he tries to run when he has time  Lifting education date: 10/9/2023  Posture/Lumbar roll:  yes  Fridge Magnet Discharge handout (date given): 12/11/2023  Equipment at home/gym membership:  belongs to gym    Education provided:   - headache positioning  -diaphragmatic breathing to reduce upper neck tension with shallow breathing  -wellness discussed and he wants to attend after graduation  -testing results  -wellness and visits set up    Written Home Exercises Provided: Patient instructed to cont prior HEP with additions of no money, and prone head raises  Exercises were reviewed and Caroline was able to demonstrate them prior to the end of the session.  Caroline demonstrated good  understanding of the education provided.     See EMR under Patient Instructions for exercises provided 12/14/23    Assessment     Patient has attended 20 visits of the [facility] program focusing aerobic training, isometric testing with dynamic strengthening on MedX machine for spine, whole body strengthening on peripheral strengthening equipment, HEP, and patient education. Patient has completed the [facility] Program and is ready to be transitioned into wellness program. Educated on the importance of wellness program and attending weekly in order to maintain strength. Stressed the importance of continuing exercise and maintaining proper body mechanics and ergonomics in order to fully participate in activities of daily living, work, and leisure activities. At this time, patient demonstrates improvement in ability to reduce symptoms, improved posture, improved spinal ROM and improved strength. Discharge handout with HEP given and reviewed all information given. Patient able to demonstrate and verbalize understanding. Patient does plan to attend wellness and is appropriate for transition.     -Improved posture and he is using lumbar roll.  -Improved cervical ROM to   -Improved strength at each test point on lumbar med ex IM test with 56% average improvement with reduced pain noted by patient.  -Initial outcome tool score 16% limited and current outcome tool score 6% limited indicating reduced pain and improved function.    Anticipated Barriers for therapy: nil  Pt's spiritual, cultural and educational needs considered and pt agreeable to plan of care and goals as stated below:     GOALS: Pt is in agreement with the following goals.     Short term goals: 6 weeks or 10 visits   - Pt will demonstratte increased cervical ROM as measured by med ex by 3 degrees from initial test which results in improved  ROM of neck for ease with ADLs and driving. MET 8/31/23  - Pt will demonstrate independence with reducing or controlling symptoms with ther ex, movement, or position independently, able to reduce pain 1-2 points on pain scale using strategies taught in therapy.  (MET 9/21/23)  - Pt will demonstrate increased MedX average isometric strength value by 15% with  when compared to the initial testing resulting in improved ability to perform bending, lifting, and carrying activities safely and confidently.  (MET  9/21/23)  Long term goals: 10 weeks or 20 visits  - Pt will demonstratte increased cervical ROM as measured by med ex by 8 degrees from initial test which results in functional ROM of neck for ease with ADLs and driving. MET 8/31/23  - Pt will demonstrate increased MedX average isometric strength value  by 40% from initial test to improve ability to lift and carry, and sustain good posture while performing ADL's.  (MET 9/21/23)  - Pt will demonstrate reduced pain and improved functional outcomes as reported on the Neck Disability Index by reaching a score of 5/50 or less in order to demonstrate subjective improvement in pt's condition.   (MET 9/21/23)  - Pt will demonstrate independence with reducing or controlling symptoms with ther ex, movement, or position independently, able to reduce pain 2-4 points on pain scale using strategies taught in therapy.  (MET 12/14/23)  -Pt will demonstrate independence with the HEP at discharge. (MET 12/14/23)  - sleep well.  Perform surgery without difficulty  (MET 12/14/23)    Plan   Discharge to wellness    Therapist: Kim Negron,PT  12/14/2023

## 2023-12-22 ENCOUNTER — DOCUMENTATION ONLY (OUTPATIENT)
Dept: REHABILITATION | Facility: HOSPITAL | Age: 48
End: 2023-12-22
Payer: COMMERCIAL

## 2023-12-22 NOTE — PROGRESS NOTES
Health  Wellness Visit Note    Name: Caroline Scar Raphael MD  Clinic Number: [id number]  Physician: Sonal Reagan, *  Diagnosis: No diagnosis found.  Past Medical History:   Diagnosis Date    Chronic pain of right knee     Possible iliotibial band syndrome (normal MRI)    DJD (degenerative joint disease) of cervical spine     Hyperlipidemia     Occipital neuralgia     Onychomycosis      Visit Number: 21   Precautions: Standard    1st PT visit:  06/15/2023  Year of care end date:  June 2024  Mindbody plan: Employee-- 6 Months  Patient level: NP      Time In: 8:00 AM  Time Out: 8:48 AM  Total Treatment Time: 48 Minutes    [facility]  Handout on this week's wellness topic Time Management provided  along with a discussion on what it means, the benefits, and suggestions for practice.  Reviewed last week's topic of n/a (today is the patient's initial Wellness session).    Subjective:   Patient reports no neck pain coming into Wellness. Since his physical therapy discharge, he occasionally has some neck pain while at work. HC and patient discussed the importance of stepping away from the microscopes and computers from time to time and stretching. When patient goes to the gym he does more cardio than weight training. He does not ice his neck at home.    Objective:   Caroline completed therapeutic stretches (Neck Retractions, etc) and the following MedX exercise machines: core cervical, torso rotation l/r, leg extension, leg curl, upright row, chest press, biceps curl, triceps extension, leg press      Fitness Machine Education Key:  E=education on equipment initiated and further follow up and education needed  I=independent with  and exercise.  The patient:  Adjusts machines to his/her settings  Uses equipment levers, pins, weights safely  Maintains safe and correct posture while exercising  Moves through exercise with correct pace and control  Gets on and off equipment  safely          Lumbar/Cervical Ext. X Torso Rotation  Leg Press    Leg Extension  Seated Leg Curl  Chest Press    Seated Row  Hip ADD  Hip ABD    Triceps Extension  Bicep Curl  Other:        [x] Indicates exercise has been taught for home  Lumbar/Cervical Ext. [] Torso Rotation [] Leg Press []   Leg Extension [] Seated Leg Curl [] Chest Press []   Seated Row [] Hip ADD [] Hip ABD []   Triceps Extension [] Bicep Curl [] Other:        Please see exercise log in patient folder for rate of exertion and repetitions completed.     Assessment:   Patient tolerated Patient tolerated the Med X Cervical extension and all other peripheral exercises without an increase in symptoms. Patient warmed up on the treadmill for 5 minutes, stretched, but opted out of icing neck for 5 minutes at the end of the workout.     Plan:  Continue with established plan of care towards wellness goals.     Health  : Alma Ruiz  12/22/2023

## 2024-01-22 ENCOUNTER — DOCUMENTATION ONLY (OUTPATIENT)
Dept: REHABILITATION | Facility: HOSPITAL | Age: 49
End: 2024-01-22
Payer: COMMERCIAL

## 2024-01-22 NOTE — PROGRESS NOTES
Health  Wellness Visit Note    Name: Caroline Scar Raphael MD  Clinic Number: [id number]  Physician: No ref. provider found  Diagnosis: No diagnosis found.  Past Medical History:   Diagnosis Date    Chronic pain of right knee     Possible iliotibial band syndrome (normal MRI)    DJD (degenerative joint disease) of cervical spine     Hyperlipidemia     Occipital neuralgia     Onychomycosis      Visit Number: 22  Precautions: Standard    1st PT visit:  06/15/2023  Year of care end date:  June 2024  Mindbody plan: Employee-- 6 Months  Patient level: C      Time In: 2:40 PM  Time Out: 3:25 AM  Total Treatment Time: 45 Minutes    [facility]22  Handout on this week's wellness topic Kindness provided  along with a discussion on what it means, the benefits, and suggestions for practice.  Reviewed last week's topic of n/a (patient did not attend Wellness last week).    Subjective:   Patient reports to Wellness after missing the last few weeks. He has no complaints and says no neck pain. Since his last Wellness session, he has been busy with work and the freeze stopped him from coming in. In between Wellness session he went to the [facility] gym to do some cardio and some weight training. He also stays busy with his children. Patient does not ice, apply heat or roll out at home.     Objective:   Caroline completed therapeutic stretches (Neck Retractions, etc) and the following MedX exercise machines: core cervical, torso rotation l/r, leg extension, leg curl, upright row, chest press, biceps curl, triceps extension, leg press      Fitness Machine Education Key:  E=education on equipment initiated and further follow up and education needed  I=independent with  and exercise.  The patient:  Adjusts machines to his/her settings  Uses equipment levers, pins, weights safely  Maintains safe and correct posture while exercising  Moves through exercise with correct pace and control  Gets on and off equipment  safely          Lumbar/Cervical Ext. X Torso Rotation  Leg Press    Leg Extension  Seated Leg Curl  Chest Press    Seated Row  Hip ADD  Hip ABD    Triceps Extension  Bicep Curl  Other:        [x] Indicates exercise has been taught for home  Lumbar/Cervical Ext. [] Torso Rotation [] Leg Press []   Leg Extension [] Seated Leg Curl [] Chest Press []   Seated Row [] Hip ADD [] Hip ABD []   Triceps Extension [] Bicep Curl [] Other:        Please see exercise log in patient folder for rate of exertion and repetitions completed.     Assessment:   Patient tolerated Patient tolerated the Med X Cervical extension and all other peripheral exercises without an increase in symptoms. Patient warmed up on the treadmill for 5 minutes, stretched, but opted out of icing neck for 5 minutes at the end of the workout.     Plan:  Continue with established plan of care towards wellness goals.     Health  : Alma Ruiz  1/22/2024

## 2024-01-29 ENCOUNTER — DOCUMENTATION ONLY (OUTPATIENT)
Dept: REHABILITATION | Facility: HOSPITAL | Age: 49
End: 2024-01-29
Payer: COMMERCIAL

## 2024-01-29 NOTE — PROGRESS NOTES
Health  Wellness Visit Note    Name: Caroline Scar Raphael MD  Clinic Number: [id number]  Physician: No ref. provider found  Diagnosis: No diagnosis found.  Past Medical History:   Diagnosis Date    Chronic pain of right knee     Possible iliotibial band syndrome (normal MRI)    DJD (degenerative joint disease) of cervical spine     Hyperlipidemia     Occipital neuralgia     Onychomycosis      Visit Number: 23  Precautions: Standard    1st PT visit:  06/15/2023  Year of care end date:  June 2024  Mindbody plan: Employee-- 6 Months  Patient level: C      Time In: 2:30 PM  Time Out: 3:15 AM  Total Treatment Time: 45 Minutes    Wellness [employer] 2022  Handout on this week's wellness topic Balanced Wellbeing provided  along with a discussion on what it means, the benefits, and suggestions for practice.  Reviewed last week's topic of Kindness.     Subjective:   Patient reports no neck pain. He has been feeling great with everything. Over the last week he worked his normal work week and stretched when he had the time to. Patient has a membership at the [facility] gym where he uses their weight room and does cardio on the treadmill. He does not ice, apply heat or roll out at home.    Objective:   Caroline completed therapeutic stretches (Neck Retractions, etc) and the following MedX exercise machines: core cervical, torso rotation l/r, leg extension, leg curl, upright row, chest press, biceps curl, triceps extension, leg press      Fitness Machine Education Key:  E=education on equipment initiated and further follow up and education needed  I=independent with  and exercise.  The patient:  Adjusts machines to his/her settings  Uses equipment levers, pins, weights safely  Maintains safe and correct posture while exercising  Moves through exercise with correct pace and control  Gets on and off equipment safely          Lumbar/Cervical Ext. X Torso Rotation  Leg Press    Leg Extension  Seated Leg Curl  Chest Press    Seated Row   Hip ADD  Hip ABD    Triceps Extension  Bicep Curl  Other:        [x] Indicates exercise has been taught for home  Lumbar/Cervical Ext. [] Torso Rotation [] Leg Press []   Leg Extension [] Seated Leg Curl [] Chest Press []   Seated Row [] Hip ADD [] Hip ABD []   Triceps Extension [] Bicep Curl [] Other:        Please see exercise log in patient folder for rate of exertion and repetitions completed.     Assessment:   Patient tolerated Patient tolerated the Med X Cervical extension and all other peripheral exercises without an increase in symptoms. Patient warmed up on the treadmill for 5 minutes, stretched, but opted out of icing neck for 5 minutes at the end of the workout.     Plan:  Continue with established plan of care towards wellness goals.     Health  : Alma Ruiz  1/29/2024

## 2024-02-19 ENCOUNTER — DOCUMENTATION ONLY (OUTPATIENT)
Dept: REHABILITATION | Facility: HOSPITAL | Age: 49
End: 2024-02-19
Payer: COMMERCIAL

## 2024-02-19 NOTE — PROGRESS NOTES
Health  Wellness Visit Note    Name: Caroline Scar Raphael MD  Clinic Number: [id number]  Physician: No ref. provider found  Diagnosis: No diagnosis found.  Past Medical History:   Diagnosis Date    Chronic pain of right knee     Possible iliotibial band syndrome (normal MRI)    DJD (degenerative joint disease) of cervical spine     Hyperlipidemia     Occipital neuralgia     Onychomycosis      Visit Number: 24  Precautions: Standard    1st PT visit:  06/15/2023  Year of care end date:  June 2024  Mindbody plan: Employee-- 6 Months  Patient level: C      Time In: 2:47 PM  Time Out: 3:30 PM  Total Treatment Time: 43 Minutes    Wellness [employer] 2022  Handout on this week's wellness topic Flexibility provided  along with a discussion on what it means, the benefits, and suggestions for practice.  Reviewed last week's topic of n/a (patient did not attend Wellness last week).     Subjective:   Patient reports no neck pain coming into Wellness today. He went out of town the last week with family. He stayed active skiing and touring the area. He plans to get back to his normal routine of work, his children's schedule and finding time to go to the [facility] gym. Since his last Wellness session, he did not apply heat or ice.     Objective:   Caroline completed therapeutic stretches (Neck Retractions, etc) and the following MedX exercise machines: core cervical, torso rotation l/r, leg extension, leg curl, upright row, chest press, biceps curl, triceps extension, leg press      Fitness Machine Education Key:  E=education on equipment initiated and further follow up and education needed  I=independent with  and exercise.  The patient:  Adjusts machines to his/her settings  Uses equipment levers, pins, weights safely  Maintains safe and correct posture while exercising  Moves through exercise with correct pace and control  Gets on and off equipment safely          Lumbar/Cervical Ext. X Torso Rotation E Leg Press E   Leg  Extension  Seated Leg Curl  Chest Press    Seated Row  Hip ADD  Hip ABD    Triceps Extension  Bicep Curl  Other:        [x] Indicates exercise has been taught for home  Lumbar/Cervical Ext. [] Torso Rotation [] Leg Press []   Leg Extension [] Seated Leg Curl [] Chest Press []   Seated Row [] Hip ADD [] Hip ABD []   Triceps Extension [] Bicep Curl [] Other:        Please see exercise log in patient folder for rate of exertion and repetitions completed.     Assessment:   Patient tolerated Patient tolerated the Med X Cervical extension and all other peripheral exercises without an increase in symptoms. Patient warmed up on the treadmill for 5 minutes, stretched, but opted out of icing neck for 5 minutes at the end of the workout.     Plan:  Continue with established plan of care towards wellness goals.     Health  : Alma Ruiz  2/19/2024

## 2024-02-26 ENCOUNTER — DOCUMENTATION ONLY (OUTPATIENT)
Dept: REHABILITATION | Facility: HOSPITAL | Age: 49
End: 2024-02-26
Payer: COMMERCIAL

## 2024-02-26 NOTE — PROGRESS NOTES
Health  Wellness Visit Note    Name: Caroline Scar Raphael MD  Clinic Number: [id number]  Physician: No ref. provider found  Diagnosis: No diagnosis found.  Past Medical History:   Diagnosis Date    Chronic pain of right knee     Possible iliotibial band syndrome (normal MRI)    DJD (degenerative joint disease) of cervical spine     Hyperlipidemia     Occipital neuralgia     Onychomycosis      Visit Number: 25  Precautions: Standard    1st PT visit:  06/15/2023  Year of care end date:  June 2024  Mindbody plan: Employee-- 6 Months  Patient level: C      Time In: 2:40 PM  Time Out: 3:20 PM  Total Treatment Time: 40 Minutes    Wellness Vision 2022  Handout on this week's wellness topic Balance provided  along with a discussion on what it means, the benefits, and suggestions for practice.  Reviewed last week's topic of Flexibility.     Subjective:   Patient reports he has no neck pain.Over the weekend he stayed busy with his children's sporting events. He unfortunately did not find any additional time to exercise on his own last week. Patient's goal is to do Wellness once a week, go for a run once a week and the gym on his own once a week. He does not ice his neck at home.    Objective:   Caroline completed therapeutic stretches (Neck Retractions, etc) and the following MedX exercise machines: core cervical, torso rotation l/r, leg extension, leg curl, upright row, chest press, biceps curl, triceps extension, leg press      Fitness Machine Education Key:  E=education on equipment initiated and further follow up and education needed  I=independent with  and exercise.  The patient:  Adjusts machines to his/her settings  Uses equipment levers, pins, weights safely  Maintains safe and correct posture while exercising  Moves through exercise with correct pace and control  Gets on and off equipment safely          Lumbar/Cervical Ext. X Torso Rotation E Leg Press E   Leg Extension  Seated Leg Curl  Chest Press     Seated Row  Hip ADD  Hip ABD    Triceps Extension  Bicep Curl  Other:        [x] Indicates exercise has been taught for home  Lumbar/Cervical Ext. [] Torso Rotation [] Leg Press []   Leg Extension [] Seated Leg Curl [] Chest Press []   Seated Row [] Hip ADD [] Hip ABD []   Triceps Extension [] Bicep Curl [] Other:        Please see exercise log in patient folder for rate of exertion and repetitions completed.     Assessment:   Patient tolerated Patient tolerated the Med X Cervical extension and all other peripheral exercises without an increase in symptoms. Patient warmed up on the treadmill for 5 minutes, stretched, but opted out of icing neck for 5 minutes at the end of the workout.     Plan:  Continue with established plan of care towards wellness goals.     Health  : Alma Ruiz  2/26/2024

## 2024-03-04 ENCOUNTER — DOCUMENTATION ONLY (OUTPATIENT)
Dept: REHABILITATION | Facility: HOSPITAL | Age: 49
End: 2024-03-04
Payer: COMMERCIAL

## 2024-03-04 NOTE — PROGRESS NOTES
Health  Wellness Visit Note    Name: Caroline Scar Raphael MD  Clinic Number: [id number]  Physician: No ref. provider found  Diagnosis: No diagnosis found.  Past Medical History:   Diagnosis Date    Chronic pain of right knee     Possible iliotibial band syndrome (normal MRI)    DJD (degenerative joint disease) of cervical spine     Hyperlipidemia     Occipital neuralgia     Onychomycosis      Visit Number: 26  Precautions: Standard    1st PT visit:  06/15/2023  Year of care end date:  June 2024  Mindbody plan: Employee-- 6 Months  Patient level: C      Time In: 2:35 PM  Time Out: 3:13 PM  Total Treatment Time: 38 Minutes    Wellness Vision 2022  Handout on this week's wellness topic Hydration provided  along with a discussion on what it means, the benefits, and suggestions for practice.  Reviewed last week's topic of Balance.     Subjective:   Patient reports no neck pain but does have some stiffness. Over the weekend his parent's came in town so he stayed busy hosting them. He did not have any time to do any solo exercising.     Patient's goal is to do Wellness once a week, go for a run once a week and the gym on his own once a week.     Objective:   Caroline completed therapeutic stretches (Neck Retractions, etc) and the following MedX exercise machines: core cervical, torso rotation l/r, leg extension, leg curl, upright row, chest press, biceps curl, triceps extension, leg press      Fitness Machine Education Key:  E=education on equipment initiated and further follow up and education needed  I=independent with  and exercise.  The patient:  Adjusts machines to his/her settings  Uses equipment levers, pins, weights safely  Maintains safe and correct posture while exercising  Moves through exercise with correct pace and control  Gets on and off equipment safely          Lumbar/Cervical Ext. X Torso Rotation E Leg Press E   Leg Extension E Seated Leg Curl E Chest Press    Seated Row  Hip ADD  Hip ABD     Triceps Extension  Bicep Curl  Other:        [x] Indicates exercise has been taught for home  Lumbar/Cervical Ext. [] Torso Rotation [] Leg Press []   Leg Extension [] Seated Leg Curl [] Chest Press []   Seated Row [] Hip ADD [] Hip ABD []   Triceps Extension [] Bicep Curl [] Other:        Please see exercise log in patient folder for rate of exertion and repetitions completed.     Assessment:   Patient tolerated Patient tolerated the Med X Cervical extension and all other peripheral exercises without an increase in symptoms. Patient warmed up on the treadmill for 5 minutes, stretched, but opted out of icing neck for 5 minutes at the end of the workout.     Plan:  Continue with established plan of care towards wellness goals.     Health  : Alma Ruiz  3/4/2024

## 2024-03-11 ENCOUNTER — DOCUMENTATION ONLY (OUTPATIENT)
Dept: REHABILITATION | Facility: HOSPITAL | Age: 49
End: 2024-03-11
Payer: COMMERCIAL

## 2024-03-11 NOTE — PROGRESS NOTES
Health  Wellness Visit Note    Name: Caroline Scar Raphael MD  Clinic Number: [id number]  Physician: No ref. provider found  Diagnosis: No diagnosis found.  Past Medical History:   Diagnosis Date    Chronic pain of right knee     Possible iliotibial band syndrome (normal MRI)    DJD (degenerative joint disease) of cervical spine     Hyperlipidemia     Occipital neuralgia     Onychomycosis      Visit Number: 27  Precautions: Standard    1st PT visit:  06/15/2023  Year of care end date:  June 2024  Mindbody plan: Employee-- 6 Months  Patient level: C      Time In: 2:35 PM  Time Out: 3:19 PM  Total Treatment Time: 44 Minutes    Wellness Vision 2022  Handout on this week's wellness topic Food Journal provided  along with a discussion on what it means, the benefits, and suggestions for practice.  Reviewed last week's topic of Hydration.     Subjective:   Patient reports to Wellness with full body soreness. Over the weekend he played pickleball at the [facility]. His glutes were the most sore. Since his last Wellness session, he did some light stretches throughout his work day. He did not do any additional exercises. Patient does not ice at home.    Patient's goal is to do Wellness once a week, go for a run once a week and the gym on his own once a week.     Objective:   Caroline completed therapeutic stretches (Neck Retractions, etc) and the following MedX exercise machines: core cervical, torso rotation l/r, leg extension, leg curl, upright row, chest press, biceps curl, triceps extension, leg press      Fitness Machine Education Key:  E=education on equipment initiated and further follow up and education needed  I=independent with  and exercise.  The patient:  Adjusts machines to his/her settings  Uses equipment levers, pins, weights safely  Maintains safe and correct posture while exercising  Moves through exercise with correct pace and control  Gets on and off equipment safely          Lumbar/Cervical Ext. X Torso  Rotation E Leg Press E   Leg Extension E Seated Leg Curl E Chest Press    Seated Row  Hip ADD  Hip ABD    Triceps Extension  Bicep Curl  Other:        [x] Indicates exercise has been taught for home  Lumbar/Cervical Ext. [] Torso Rotation [] Leg Press []   Leg Extension [] Seated Leg Curl [] Chest Press []   Seated Row [] Hip ADD [] Hip ABD []   Triceps Extension [] Bicep Curl [] Other:        Please see exercise log in patient folder for rate of exertion and repetitions completed.     Assessment:   Patient tolerated Patient tolerated the Med X Cervical extension and all other peripheral exercises without an increase in symptoms. Patient warmed up on the treadmill for 5 minutes, stretched, but opted out of icing neck for 5 minutes at the end of the workout.     Plan:  Continue with established plan of care towards wellness goals.     Health  : Alma Ruiz  3/11/2024

## 2024-04-20 RX ORDER — CIPROFLOXACIN 500 MG/1
500 TABLET ORAL EVERY 12 HOURS
Qty: 60 TABLET | Refills: 0 | Status: SHIPPED | OUTPATIENT
Start: 2024-04-20 | End: 2024-05-20

## 2024-04-20 RX ORDER — PREDNISONE 20 MG/1
20 TABLET ORAL DAILY
Qty: 30 TABLET | Refills: 1 | Status: SHIPPED | OUTPATIENT
Start: 2024-04-20

## 2024-04-20 RX ORDER — CIPROFLOXACIN 500 MG/1
500 TABLET ORAL EVERY 12 HOURS
Qty: 60 TABLET | Refills: 0 | Status: SHIPPED | OUTPATIENT
Start: 2024-04-20 | End: 2024-04-20 | Stop reason: SDUPTHER

## 2024-04-20 RX ORDER — PREDNISONE 20 MG/1
20 TABLET ORAL DAILY
Qty: 30 TABLET | Refills: 0 | Status: SHIPPED | OUTPATIENT
Start: 2024-04-20 | End: 2024-04-20 | Stop reason: SDUPTHER

## 2024-04-22 ENCOUNTER — DOCUMENTATION ONLY (OUTPATIENT)
Dept: REHABILITATION | Facility: HOSPITAL | Age: 49
End: 2024-04-22
Payer: COMMERCIAL

## 2024-04-22 NOTE — PROGRESS NOTES
Health  Wellness Visit Note    Name: Caroline Scar Raphael MD  Clinic Number: [id number]  Physician: No ref. provider found  Diagnosis: No diagnosis found.  Past Medical History:   Diagnosis Date    Chronic pain of right knee     Possible iliotibial band syndrome (normal MRI)    DJD (degenerative joint disease) of cervical spine     Hyperlipidemia     Occipital neuralgia     Onychomycosis      Visit Number: 28  Precautions: Standard    1st PT visit:  06/15/2023  Year of care end date:  June 2024  Mindbody plan: Employee-- 6 Months  Patient level: C      Time In: 2:30 PM  Time Out: 3:20 PM  Total Treatment Time: 50 Minutes    Wellness Vision 2022  Handout on this week's wellness topic Breathing Exercises provided  along with a discussion on what it means, the benefits, and suggestions for practice.  Reviewed last week's topic of n/a (patient did not attend Wellness last week).     Subjective:   Patient reports to Wellness after missing a month. He has been sick, busy with work and went on vacation. Since his last Wellness session, he has been to the gym, stretches when he can and staying active with his children. He has no complaints of neck pain or discomfort. Patient does not ice at home.    Patient's goal is to do Wellness once a week, go for a run once a week and the gym on his own once a week.     Objective:   Caroline completed therapeutic stretches (Neck Retractions, etc) and the following MedX exercise machines: core cervical, torso rotation l/r, leg extension, leg curl, upright row, chest press, biceps curl, triceps extension, leg press      Fitness Machine Education Key:  E=education on equipment initiated and further follow up and education needed  I=independent with  and exercise.  The patient:  Adjusts machines to his/her settings  Uses equipment levers, pins, weights safely  Maintains safe and correct posture while exercising  Moves through exercise with correct pace and control  Gets on and off  equipment safely          Lumbar/Cervical Ext. X Torso Rotation E Leg Press E   Leg Extension E Seated Leg Curl E Chest Press    Seated Row  Hip ADD  Hip ABD    Triceps Extension  Bicep Curl  Other:        [x] Indicates exercise has been taught for home  Lumbar/Cervical Ext. [] Torso Rotation [] Leg Press []   Leg Extension [] Seated Leg Curl [] Chest Press []   Seated Row [] Hip ADD [] Hip ABD []   Triceps Extension [] Bicep Curl [] Other:        Please see exercise log in patient folder for rate of exertion and repetitions completed.     Assessment:   Patient tolerated Patient tolerated the Med X Cervical extension and all other peripheral exercises without an increase in symptoms. Patient warmed up on the treadmill for 5 minutes, stretched, but opted out of icing neck for 5 minutes at the end of the workout.     Plan:  Continue with established plan of care towards wellness goals.     Health  : Alma Ruiz  4/22/2024

## 2024-05-01 RX ORDER — AZITHROMYCIN 500 MG/1
500 TABLET, FILM COATED ORAL DAILY
Qty: 20 TABLET | Refills: 0 | Status: SHIPPED | OUTPATIENT
Start: 2024-05-01 | End: 2024-05-23

## 2024-05-13 ENCOUNTER — DOCUMENTATION ONLY (OUTPATIENT)
Dept: REHABILITATION | Facility: HOSPITAL | Age: 49
End: 2024-05-13
Payer: COMMERCIAL

## 2024-05-13 NOTE — PROGRESS NOTES
Health  Wellness Visit Note    Name: Caroline Scra Raphael MD  Clinic Number: [id number]  Physician: No ref. provider found  Diagnosis: No diagnosis found.  Past Medical History:   Diagnosis Date    Chronic pain of right knee     Possible iliotibial band syndrome (normal MRI)    DJD (degenerative joint disease) of cervical spine     Hyperlipidemia     Occipital neuralgia     Onychomycosis      Visit Number: 29  Precautions: Standard    1st PT visit:  06/15/2023  Year of care end date:  June 2024  Mindbody plan: Employee-- 6 Months  Patient level: C      Time In: 1:35 PM  Time Out: 2:30 PM  Total Treatment Time: 55 Minutes    Wellness Vision 2022  Handout on this week's wellness topic Positive Thinking provided  along with a discussion on what it means, the benefits, and suggestions for practice.  Reviewed last week's topic of n/a (patient did not attend Wellness last week).     Subjective:   Patient reports to Wellness after a missing a week on an out of town work trip. He has no complaints of neck pain. Since his last session, he has been to the gym and stayed active with activities with his family. He did not play pickleball. Patient does not ice at home.    Patient's goal is to do Wellness once a week, go for a run once a week and the gym on his own once a week.     Objective:   Caroline completed therapeutic stretches (Neck Retractions, etc) and the following MedX exercise machines: core cervical, torso rotation l/r, leg extension, leg curl, upright row, chest press, biceps curl, triceps extension, leg press      Fitness Machine Education Key:  E=education on equipment initiated and further follow up and education needed  I=independent with  and exercise.  The patient:  Adjusts machines to his/her settings  Uses equipment levers, pins, weights safely  Maintains safe and correct posture while exercising  Moves through exercise with correct pace and control  Gets on and off equipment  safely          Lumbar/Cervical Ext. X Torso Rotation E Leg Press E   Leg Extension E Seated Leg Curl E Chest Press    Seated Row  Hip ADD  Hip ABD    Triceps Extension  Bicep Curl  Other:        [x] Indicates exercise has been taught for home  Lumbar/Cervical Ext. [] Torso Rotation [] Leg Press []   Leg Extension [] Seated Leg Curl [] Chest Press []   Seated Row [] Hip ADD [] Hip ABD []   Triceps Extension [] Bicep Curl [] Other:        Please see exercise log in patient folder for rate of exertion and repetitions completed.     Assessment:   Patient tolerated Patient tolerated the Med X Cervical extension and all other peripheral exercises without an increase in symptoms. Patient warmed up on the treadmill for 5 minutes, stretched, but opted out of icing neck for 5 minutes at the end of the workout.     Plan:  Continue with established plan of care towards wellness goals.     Health  : Alma Ruiz  5/13/2024

## 2024-05-21 ENCOUNTER — TELEPHONE (OUTPATIENT)
Dept: DERMATOLOGY | Facility: CLINIC | Age: 49
End: 2024-05-21
Payer: COMMERCIAL

## 2024-05-23 ENCOUNTER — TELEPHONE (OUTPATIENT)
Dept: DERMATOLOGY | Facility: CLINIC | Age: 49
End: 2024-05-23
Payer: COMMERCIAL

## 2024-05-27 ENCOUNTER — DOCUMENTATION ONLY (OUTPATIENT)
Dept: REHABILITATION | Facility: HOSPITAL | Age: 49
End: 2024-05-27
Payer: COMMERCIAL

## 2024-05-27 NOTE — PROGRESS NOTES
Health  Wellness Visit Note    Name: Caroline Scar Raphael MD  Clinic Number: [id number]  Physician: No ref. provider found  Diagnosis: No diagnosis found.  Past Medical History:   Diagnosis Date    Chronic pain of right knee     Possible iliotibial band syndrome (normal MRI)    DJD (degenerative joint disease) of cervical spine     Hyperlipidemia     Occipital neuralgia     Onychomycosis      Visit Number: 30  Precautions: Standard    1st PT visit:  06/15/2023  Year of care end date:  June 2024  Mindbody plan: Employee-- 6 Months  Patient level: C      Time In: 1:15 PM  Time Out: 2:10 PM  Total Treatment Time: 55 Minutes    Wellness Vision 2022  Handout on this week's wellness topic Mindfulness provided along with a discussion on what it means, the benefits, and suggestions for practice.  Reviewed last week's topic of n/a (patient did not attend Wellness last week).     Subjective:   Patient reports he is neck pain free. Since his last Wellness session, he has been to the gym, stretched and spent time with his family. Patient does not ice at home.     Patient's goal is to do Wellness once a week, go for a run once a week and the gym on his own once a week.     Objective:   Caroline completed therapeutic stretches (Neck Retractions, etc) and the following MedX exercise machines: core cervical, torso rotation l/r, leg extension, leg curl, upright row, chest press, biceps curl, triceps extension, leg press      Fitness Machine Education Key:  E=education on equipment initiated and further follow up and education needed  I=independent with  and exercise.  The patient:  Adjusts machines to his/her settings  Uses equipment levers, pins, weights safely  Maintains safe and correct posture while exercising  Moves through exercise with correct pace and control  Gets on and off equipment safely          Lumbar/Cervical Ext. X Torso Rotation E Leg Press E   Leg Extension E Seated Leg Curl E Chest Press    Seated Row   Hip ADD  Hip ABD    Triceps Extension  Bicep Curl  Other:        [x] Indicates exercise has been taught for home  Lumbar/Cervical Ext. [] Torso Rotation [] Leg Press []   Leg Extension [] Seated Leg Curl [] Chest Press []   Seated Row [] Hip ADD [] Hip ABD []   Triceps Extension [] Bicep Curl [] Other:        Please see exercise log in patient folder for rate of exertion and repetitions completed.     Assessment:   Patient tolerated Patient tolerated the Med X Cervical extension and all other peripheral exercises without an increase in symptoms. Patient warmed up on the treadmill for 5 minutes, stretched, but opted out of icing neck for 5 minutes at the end of the workout.     Plan:  Continue with established plan of care towards wellness goals.     Health  : Alma Ruiz  5/27/2024

## 2024-06-03 ENCOUNTER — DOCUMENTATION ONLY (OUTPATIENT)
Dept: REHABILITATION | Facility: HOSPITAL | Age: 49
End: 2024-06-03
Payer: COMMERCIAL

## 2024-06-03 NOTE — PROGRESS NOTES
Health  Wellness Visit Note    Name: Caroline Scar Raphael MD  Clinic Number: [id number]  Physician: No ref. provider found  Diagnosis: No diagnosis found.  Past Medical History:   Diagnosis Date    Chronic pain of right knee     Possible iliotibial band syndrome (normal MRI)    DJD (degenerative joint disease) of cervical spine     Hyperlipidemia     Occipital neuralgia     Onychomycosis      Visit Number: 31  Precautions: Standard    1st PT visit:  06/15/2023  Year of care end date:  June 2024  Mindbody plan: Employee-- 6 Months  Patient level: C      Time In: 2:30 PM  Time Out: 3:20 PM  Total Treatment Time: 50 Minutes    [employer]  Handout on this week's wellness topic Guilt vs. Shame provided along with a discussion on what it means, the benefits, and suggestions for practice.  Reviewed last week's topic of Mindfulness.     Subjective:   Patient reports he is neck pain free. Since his last Wellness session, he has been to the gym, stretched and spent time with his family. Patient does not ice at home.     Today's session, patient learned bosu ball mountain climbers, plank to downward dog plank toe-touches, goblet squats, step ups, deadlifts, single arm and  dumbbell row.     Patient's goal is to do Wellness once a week, go for a run once a week and the gym on his own once a week.     Objective:   Caroline completed therapeutic stretches (Neck Retractions, etc) and the following MedX exercise machines: core cervical, torso rotation l/r, leg extension, leg curl, upright row, chest press, biceps curl, triceps extension, leg press      Fitness Machine Education Key:  E=education on equipment initiated and further follow up and education needed  I=independent with  and exercise.  The patient:  Adjusts machines to his/her settings  Uses equipment levers, pins, weights safely  Maintains safe and correct posture while exercising  Moves through exercise with correct pace and control  Gets on and off  equipment safely          Lumbar/Cervical Ext. X Torso Rotation E Leg Press E   Leg Extension E Seated Leg Curl E Chest Press    Seated Row  Hip ADD  Hip ABD    Triceps Extension  Bicep Curl  Other:        [x] Indicates exercise has been taught for home  Lumbar/Cervical Ext. [] Torso Rotation [] Leg Press []   Leg Extension [] Seated Leg Curl [] Chest Press []   Seated Row [] Hip ADD [] Hip ABD []   Triceps Extension [] Bicep Curl [] Other:        Please see exercise log in patient folder for rate of exertion and repetitions completed.     Assessment:   Patient tolerated Patient tolerated the Med X Cervical extension and all other peripheral exercises without an increase in symptoms. Patient warmed up on the treadmill for 5 minutes, stretched, but opted out of icing neck for 5 minutes at the end of the workout.     Plan:  Continue with established plan of care towards wellness goals.     Health  : Alma Ruiz  6/3/2024

## 2024-06-05 ENCOUNTER — OFFICE VISIT (OUTPATIENT)
Dept: DERMATOLOGY | Facility: CLINIC | Age: 49
End: 2024-06-05
Payer: COMMERCIAL

## 2024-06-05 DIAGNOSIS — Q82.8 POROKERATOSIS: Primary | ICD-10-CM

## 2024-06-05 PROCEDURE — 99203 OFFICE O/P NEW LOW 30 MIN: CPT | Mod: S$GLB,,, | Performed by: STUDENT IN AN ORGANIZED HEALTH CARE EDUCATION/TRAINING PROGRAM

## 2024-06-05 PROCEDURE — 1160F RVW MEDS BY RX/DR IN RCRD: CPT | Mod: CPTII,S$GLB,, | Performed by: STUDENT IN AN ORGANIZED HEALTH CARE EDUCATION/TRAINING PROGRAM

## 2024-06-05 PROCEDURE — 99999 PR PBB SHADOW E&M-EST. PATIENT-LVL III: CPT | Mod: PBBFAC,,, | Performed by: STUDENT IN AN ORGANIZED HEALTH CARE EDUCATION/TRAINING PROGRAM

## 2024-06-05 PROCEDURE — 1159F MED LIST DOCD IN RCRD: CPT | Mod: CPTII,S$GLB,, | Performed by: STUDENT IN AN ORGANIZED HEALTH CARE EDUCATION/TRAINING PROGRAM

## 2024-06-05 NOTE — PROGRESS NOTES
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       Objective:   Physical Exam   Constitutional: He appears well-developed and well-nourished. No distress.   Neurological: He is alert and oriented to person, place, and time. He is not disoriented.   Psychiatric: He has a normal mood and affect.   Skin:   Areas Examined (abnormalities noted in diagram):   Abdomen Inspection Performed            Diagram Legend     Erythematous scaling macule/papule c/w actinic keratosis       Vascular papule c/w angioma      Pigmented verrucoid papule/plaque c/w seborrheic keratosis      Yellow umbilicated papule c/w sebaceous hyperplasia      Irregularly shaped tan macule c/w lentigo     1-2 mm smooth white papules consistent with Milia      Movable subcutaneous cyst with punctum c/w epidermal inclusion cyst      Subcutaneous movable cyst c/w pilar cyst      Firm pink to brown papule c/w dermatofibroma      Pedunculated fleshy papule(s) c/w skin tag(s)      Evenly pigmented macule c/w junctional nevus     Mildly variegated pigmented, slightly irregular-bordered macule c/w mildly atypical nevus      Flesh colored to evenly pigmented papule c/w intradermal nevus       Pink pearly papule/plaque c/w basal cell carcinoma      Erythematous hyperkeratotic cursted plaque c/w SCC      Surgical scar with no sign of skin cancer recurrence      Open and closed comedones      Inflammatory papules and pustules      Verrucoid papule consistent consistent with wart     Erythematous eczematous patches and plaques     Dystrophic onycholytic nail with subungual debris c/w onychomycosis     Umbilicated papule    Erythematous-base heme-crusted tan  verrucoid plaque consistent with inflamed seborrheic keratosis     Erythematous Silvery Scaling Plaque c/w Psoriasis     See annotation      Assessment / Plan:        Porokeratosis    - exam most consistent with porokeratosis given annular border of fine hyperkeratosis. Explained that biopsy would be needed to definitively diagnose. However, no dermatoscopic findings consistent with melanocytic lesion so not concerning for melanoma  - Counseled that porokeratoses are benign but do have a small premalignant potential of progressing into cutaneous SCC  - Treatment options including destructive methods such as LN2 cryosurgery (risk of hypopigmentation) or topicals (5FU, retinoids, imiquimod). He preferred to observe. Counseled to RTC if growing or changing         Follow up if symptoms worsen or fail to improve.

## 2024-06-10 ENCOUNTER — DOCUMENTATION ONLY (OUTPATIENT)
Dept: REHABILITATION | Facility: HOSPITAL | Age: 49
End: 2024-06-10
Payer: COMMERCIAL

## 2024-06-10 NOTE — PROGRESS NOTES
Health  Wellness Visit Note    Name: Pattin Scar Raphael MD  Clinic Number: [id number]  Physician: No ref. provider found  Diagnosis: No diagnosis found.  Past Medical History:   Diagnosis Date    Chronic pain of right knee     Possible iliotibial band syndrome (normal MRI)    DJD (degenerative joint disease) of cervical spine     Hyperlipidemia     Occipital neuralgia     Onychomycosis      Visit Number: 32  Precautions: Standard    1st PT visit:  06/15/2023  Year of care end date:  June 2024  Mindbody plan: Employee-- 6 Months  Patient level: B    Time In: 2:35 PM  Time Out: 3:35 PM  Total Treatment Time: 60 Minutes    [employer]22  Handout on this week's wellness topic Anxiety provided along with a discussion on what it means, the benefits, and suggestions for practice.  Reviewed last week's topic of Guilt vs. Shame.     Subjective:   Patient reports some neck pain coming into Wellness today. 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   Since his last Wellness session, he has been to the gym, stretched and spent time with his family. Patient does not ice at home.     06/03: patient learned bosu ball mountain climbers, plank to downward dog plank toe-touches, goblet squats, step ups, deadlifts, and single arm dumbbell row.     Patient's goal is to do Wellness once a week, go for a run once a week and the gym on his own once a week.     Objective:   Caroline completed therapeutic stretches (Neck Retractions, etc) and the following MedX exercise machines: core cervical, torso rotation l/r, leg extension, leg curl, upright row, chest press, biceps curl, triceps extension, leg press      Fitness Machine Education Key:  E=education on equipment initiated and further follow up and education needed  I=independent with  and exercise.  The patient:  Adjusts machines to his/her settings  Uses equipment levers, pins, weights  safely  Maintains safe and correct posture while exercising  Moves through exercise with correct pace and control  Gets on and off equipment safely          Lumbar/Cervical Ext. X Torso Rotation E Leg Press E   Leg Extension E Seated Leg Curl E Chest Press    Seated Row  Hip ADD  Hip ABD    Triceps Extension  Bicep Curl  Other:        [x] Indicates exercise has been taught for home  Lumbar/Cervical Ext. [] Torso Rotation [x] Leg Press [x]   Leg Extension [x] Seated Leg Curl [x] Chest Press [x]   Seated Row [x] Hip ADD [x] Hip ABD [x]   Triceps Extension [x] Bicep Curl [x] Other:        Please see exercise log in patient folder for rate of exertion and repetitions completed.     Assessment:   Patient tolerated Patient tolerated the Med X Cervical extension and all other peripheral exercises without an increase in symptoms. Patient warmed up on the treadmill for 5 minutes, stretched, but opted out of icing neck for 5 minutes at the end of the workout.     Plan:  Continue with established plan of care towards wellness goals.     Health  : Alma Ruiz  6/10/2024

## 2024-06-17 ENCOUNTER — DOCUMENTATION ONLY (OUTPATIENT)
Dept: REHABILITATION | Facility: HOSPITAL | Age: 49
End: 2024-06-17
Payer: COMMERCIAL

## 2024-06-17 NOTE — PROGRESS NOTES
Health  Wellness Visit Note    Name: Caroline Scar Raphael MD  Clinic Number: [id number]  Physician: No ref. provider found  Diagnosis: No diagnosis found.  Past Medical History:   Diagnosis Date    Chronic pain of right knee     Possible iliotibial band syndrome (normal MRI)    DJD (degenerative joint disease) of cervical spine     Hyperlipidemia     Occipital neuralgia     Onychomycosis      Visit Number: 33  Precautions: Standard    1st PT visit:  06/15/2023  Year of care end date:  June 2024  Mindbody plan: Employee-- 6 Months  Patient level: B    Time In: 2:30 PM  Time Out: 3:30 PM  Total Treatment Time: 60 Minutes    [employer]  Handout on this week's wellness topic Fear-Avoidance provided along with a discussion on what it means, the benefits, and suggestions for practice.  Reviewed last week's topic of Anxiety.     Subjective:   Patient reports no neck pain but a little stiffness. Since his last Wellness session he has done some stretching but not everyday. He stays busy with work and his family. Patient does not ice outside of Wellness.     06/03: patient learned bosu ball mountain climbers, plank to downward dog plank toe-touches, goblet squats, step ups, deadlifts, and single arm dumbbell row.     Patient's goal is to do Wellness once a week, go for a run once a week and the gym on his own once a week.     Objective:   Caroline completed therapeutic stretches (Neck Retractions, etc) and the following MedX exercise machines: core cervical, torso rotation l/r, leg extension, leg curl, upright row, chest press, biceps curl, triceps extension, leg press      Fitness Machine Education Key:  E=education on equipment initiated and further follow up and education needed  I=independent with  and exercise.  The patient:  Adjusts machines to his/her settings  Uses equipment levers, pins, weights safely  Maintains safe and correct posture while exercising  Moves through exercise with correct pace  and control  Gets on and off equipment safely          Lumbar/Cervical Ext. X Torso Rotation E Leg Press E   Leg Extension E Seated Leg Curl E Chest Press    Seated Row  Hip ADD  Hip ABD    Triceps Extension  Bicep Curl  Other:        [x] Indicates exercise has been taught for home  Lumbar/Cervical Ext. [] Torso Rotation [x] Leg Press [x]   Leg Extension [x] Seated Leg Curl [x] Chest Press [x]   Seated Row [x] Hip ADD [x] Hip ABD [x]   Triceps Extension [x] Bicep Curl [x] Other:        Please see exercise log in patient folder for rate of exertion and repetitions completed.     Assessment:   Patient tolerated Patient tolerated the Med X Cervical extension and all other peripheral exercises without an increase in symptoms. Patient warmed up on the treadmill for 5 minutes, stretched, but opted out of icing neck for 5 minutes at the end of the workout.     Plan:  Continue with established plan of care towards wellness goals.     Health  : Alma Ruiz  6/17/2024

## 2024-06-18 ENCOUNTER — TELEPHONE (OUTPATIENT)
Dept: INTERNAL MEDICINE | Facility: CLINIC | Age: 49
End: 2024-06-18
Payer: COMMERCIAL

## 2024-06-18 DIAGNOSIS — B35.1 ONYCHOMYCOSIS: ICD-10-CM

## 2024-06-18 DIAGNOSIS — F40.243 FLYING PHOBIA: ICD-10-CM

## 2024-06-18 RX ORDER — CICLOPIROX 80 MG/ML
SOLUTION TOPICAL NIGHTLY
Qty: 6.6 ML | Refills: 11 | Status: SHIPPED | OUTPATIENT
Start: 2024-06-18

## 2024-06-18 RX ORDER — ALPRAZOLAM 0.5 MG/1
0.5 TABLET ORAL 3 TIMES DAILY PRN
Qty: 30 TABLET | Refills: 0 | Status: SHIPPED | OUTPATIENT
Start: 2024-06-18 | End: 2024-06-18

## 2024-06-18 RX ORDER — ALPRAZOLAM 0.5 MG/1
0.5 TABLET ORAL 3 TIMES DAILY PRN
Qty: 30 TABLET | Refills: 0 | Status: SHIPPED | OUTPATIENT
Start: 2024-06-18 | End: 2024-07-18

## 2024-07-11 ENCOUNTER — DOCUMENTATION ONLY (OUTPATIENT)
Dept: REHABILITATION | Facility: HOSPITAL | Age: 49
End: 2024-07-11
Payer: COMMERCIAL

## 2024-07-11 NOTE — PROGRESS NOTES
Health  Wellness Visit Note    Name: Caroline Scar Raphael MD  Clinic Number: [id number]  Physician: No ref. provider found  Diagnosis: No diagnosis found.  Past Medical History:   Diagnosis Date    Chronic pain of right knee     Possible iliotibial band syndrome (normal MRI)    DJD (degenerative joint disease) of cervical spine     Hyperlipidemia     Occipital neuralgia     Onychomycosis      Visit Number: 34  Precautions: Standard    1st PT visit:  06/15/2023  Year of care end date:  June 2024  Mindbody plan: Employee-- 6 Months  Patient level: B    Time In: 2:10 PM  Time Out: 3:10 PM  Total Treatment Time: 60 Minutes    [employer] 2022  Handout on this week's wellness topic Play provided along with a discussion on what it means, the benefits, and suggestions for practice.  Reviewed last week's topic of n/a (patient did not attend Wellness last week).     Subjective:   Patient reports to Wellness after missing two weeks. He has been very busy traveling with family. He reports no neck pain but is sore from his first week of work. He did some light stretching here and there but not everyday. Patient does not ice outside of Wellness.     06/03: patient learned bosu ball mountain climbers, plank to downward dog plank toe-touches, goblet squats, step ups, deadlifts, and single arm dumbbell row.     Patient's goal is to do Wellness once a week, go for a run once a week and the gym on his own once a week.     Objective:   Caroline completed therapeutic stretches (Neck Retractions, etc) and the following MedX exercise machines: core cervical, torso rotation l/r, leg extension, leg curl, upright row, chest press, biceps curl, triceps extension, leg press      Fitness Machine Education Key:  E=education on equipment initiated and further follow up and education needed  I=independent with  and exercise.  The patient:  Adjusts machines to his/her settings  Uses equipment levers, pins, weights safely  Maintains  safe and correct posture while exercising  Moves through exercise with correct pace and control  Gets on and off equipment safely          Lumbar/Cervical Ext. X Torso Rotation E Leg Press E   Leg Extension E Seated Leg Curl E Chest Press X   Seated Row E Hip ADD E Hip ABD E   Triceps Extension X Bicep Curl  Other:        [x] Indicates exercise has been taught for home  Lumbar/Cervical Ext. [] Torso Rotation [x] Leg Press [x]   Leg Extension [x] Seated Leg Curl [x] Chest Press [x]   Seated Row [x] Hip ADD [x] Hip ABD [x]   Triceps Extension [x] Bicep Curl [x] Other:        Please see exercise log in patient folder for rate of exertion and repetitions completed.     Assessment:   Patient tolerated Patient tolerated the Med X Cervical extension and all other peripheral exercises without an increase in symptoms. Patient warmed up on the treadmill for 5 minutes, stretched, but opted out of icing neck for 5 minutes at the end of the workout.     Plan:  Continue with established plan of care towards wellness goals.     Health  : Alma Ruiz  7/11/2024

## 2024-07-12 DIAGNOSIS — M79.641 RIGHT HAND PAIN: Primary | ICD-10-CM

## 2024-07-16 ENCOUNTER — TELEPHONE (OUTPATIENT)
Dept: ORTHOPEDICS | Facility: CLINIC | Age: 49
End: 2024-07-16
Payer: COMMERCIAL

## 2024-07-16 NOTE — TELEPHONE ENCOUNTER
Patient communication     Notified patient to stop at [facility] Location - 1st Floor [address] [address], Please park in [place] and use [place] elevators 45 minutes prior to your appointment time for X-ray. After your X-ray please proceed to [address]th Floor [address] for Appointment on 7/17/24 with Dr. Velasquez for x-rays.     Made them aware that this is not a scheduled xray appointment and they might be running behind as they are considered a walk-in xray.    Verbalized the Following:  *Please arrive at your informed time above, if you are more than 15 Minutes late to your appointment with Dr. Velasquez we will have to reschedule your appointment. This will allow you to be seen in a timely manor and be conscious to other patients being seen that same day*

## 2024-07-22 ENCOUNTER — DOCUMENTATION ONLY (OUTPATIENT)
Dept: REHABILITATION | Facility: HOSPITAL | Age: 49
End: 2024-07-22
Payer: COMMERCIAL

## 2024-07-22 NOTE — PROGRESS NOTES
Health  Wellness Visit Note    Name: Caroline Scar Raphael MD  Clinic Number: [id number]  Physician: No ref. provider found  Diagnosis: No diagnosis found.  Past Medical History:   Diagnosis Date    Chronic pain of right knee     Possible iliotibial band syndrome (normal MRI)    DJD (degenerative joint disease) of cervical spine     Hyperlipidemia     Occipital neuralgia     Onychomycosis      Visit Number: 35  Precautions: Standard    1st PT visit:  06/15/2023  Year of care end date:  June 2024  Mindbody plan: Employee-- 6 Months  Patient level: A    Time In: 2:30 PM  Time Out: 3:30 PM  Total Treatment Time: 60 Minutes    [facility]22  Handout on this week's wellness topic Scheduled Self-Dating provided along with a discussion on what it means, the benefits, and suggestions for practice.  Reviewed last week's topic of n/a (patient did not attend Wellness last week).     Subjective:   Patient reports no complaints of neck pain today. He is a little tired and stiff coming into Wellness. He did some traveling for a wedding this past week. He went to the gym three times before going out of town.     Today is the patient's last Wellness session. His plan is to go to the gym Mondays and Thursdays following work.     06/03: patient learned bosu ball mountain climbers, plank to downward dog plank toe-touches, goblet squats, step ups, deadlifts, and single arm dumbbell row.     Patient's goal is to do Wellness once a week, go for a run once a week and the gym on his own once a week.     Objective:   Caroline completed therapeutic stretches (Neck Retractions, etc) and the following MedX exercise machines: core cervical, torso rotation l/r, leg extension, leg curl, upright row, chest press, biceps curl, triceps extension, leg press      Fitness Machine Education Key:  E=education on equipment initiated and further follow up and education needed  I=independent with  and exercise.  The patient:  Adjusts machines  to his/her settings  Uses equipment levers, pins, weights safely  Maintains safe and correct posture while exercising  Moves through exercise with correct pace and control  Gets on and off equipment safely          Lumbar/Cervical Ext. X Torso Rotation E Leg Press E   Leg Extension E Seated Leg Curl E Chest Press X   Seated Row E Hip ADD E Hip ABD E   Triceps Extension X Bicep Curl  Other:        [x] Indicates exercise has been taught for home  Lumbar/Cervical Ext. [] Torso Rotation [x] Leg Press [x]   Leg Extension [x] Seated Leg Curl [x] Chest Press [x]   Seated Row [x] Hip ADD [x] Hip ABD [x]   Triceps Extension [x] Bicep Curl [x] Other:        Please see exercise log in patient folder for rate of exertion and repetitions completed.     Assessment:   Patient tolerated Patient tolerated the Med X Cervical extension and all other peripheral exercises without an increase in symptoms. Patient warmed up on the treadmill for 5 minutes, stretched, but opted out of icing neck for 5 minutes at the end of the workout.     Plan:  Discharged from Wellness Program.     Health  : Alma Ruiz  7/22/2024

## 2024-07-23 ENCOUNTER — PATIENT MESSAGE (OUTPATIENT)
Dept: ORTHOPEDICS | Facility: CLINIC | Age: 49
End: 2024-07-23
Payer: COMMERCIAL

## 2024-07-24 ENCOUNTER — HOSPITAL ENCOUNTER (OUTPATIENT)
Dept: RADIOLOGY | Facility: OTHER | Age: 49
Discharge: HOME | End: 2024-07-24
Attending: ORTHOPAEDIC SURGERY
Payer: COMMERCIAL

## 2024-07-24 ENCOUNTER — OFFICE VISIT (OUTPATIENT)
Dept: ORTHOPEDICS | Facility: CLINIC | Age: 49
End: 2024-07-24
Payer: COMMERCIAL

## 2024-07-24 DIAGNOSIS — M79.641 RIGHT HAND PAIN: ICD-10-CM

## 2024-07-24 DIAGNOSIS — S63.276A CLOSED DISLOCATION OF INTERPHALANGEAL JOINT OF RIGHT LITTLE FINGER: Primary | ICD-10-CM

## 2024-07-24 DIAGNOSIS — M18.0 PRIMARY OSTEOARTHRITIS OF BOTH FIRST CARPOMETACARPAL JOINTS: ICD-10-CM

## 2024-07-24 DIAGNOSIS — S66.902A INJURY OF LEFT SCAPHOLUNATE LIGAMENT WITH NO INSTABILITY, INITIAL ENCOUNTER: ICD-10-CM

## 2024-07-24 DIAGNOSIS — R52 PAIN: Primary | ICD-10-CM

## 2024-07-24 DIAGNOSIS — S63.630A SPRAIN OF INTERPHALANGEAL JOINT OF RIGHT INDEX FINGER, INITIAL ENCOUNTER: ICD-10-CM

## 2024-07-24 DIAGNOSIS — R52 PAIN: ICD-10-CM

## 2024-07-24 PROCEDURE — 73130 X-RAY EXAM OF HAND: CPT | Mod: 26,LT,, | Performed by: RADIOLOGY

## 2024-07-24 PROCEDURE — 99204 OFFICE O/P NEW MOD 45 MIN: CPT | Mod: S$GLB,,, | Performed by: ORTHOPAEDIC SURGERY

## 2024-07-24 PROCEDURE — 99999 PR PBB SHADOW E&M-EST. PATIENT-LVL II: CPT | Mod: PBBFAC,,, | Performed by: ORTHOPAEDIC SURGERY

## 2024-07-24 PROCEDURE — 73130 X-RAY EXAM OF HAND: CPT | Mod: TC,FY,RT

## 2024-07-24 PROCEDURE — 1160F RVW MEDS BY RX/DR IN RCRD: CPT | Mod: CPTII,S$GLB,, | Performed by: ORTHOPAEDIC SURGERY

## 2024-07-24 PROCEDURE — 73130 X-RAY EXAM OF HAND: CPT | Mod: 26,RT,, | Performed by: RADIOLOGY

## 2024-07-24 PROCEDURE — 1159F MED LIST DOCD IN RCRD: CPT | Mod: CPTII,S$GLB,, | Performed by: ORTHOPAEDIC SURGERY

## 2024-07-24 PROCEDURE — 73130 X-RAY EXAM OF HAND: CPT | Mod: TC,FY,LT
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
## 2024-09-13 ENCOUNTER — LAB VISIT (OUTPATIENT)
Dept: LAB | Facility: HOSPITAL | Age: 49
End: 2024-09-13
Attending: STUDENT IN AN ORGANIZED HEALTH CARE EDUCATION/TRAINING PROGRAM
Payer: COMMERCIAL

## 2024-09-13 ENCOUNTER — OFFICE VISIT (OUTPATIENT)
Dept: PODIATRY | Facility: CLINIC | Age: 49
End: 2024-09-13
Payer: COMMERCIAL

## 2024-09-13 DIAGNOSIS — B35.1 TINEA UNGUIUM: Primary | ICD-10-CM

## 2024-09-13 DIAGNOSIS — B35.3 TINEA PEDIS OF BOTH FEET: ICD-10-CM

## 2024-09-13 DIAGNOSIS — B35.1 TINEA UNGUIUM: ICD-10-CM

## 2024-09-13 LAB
ALBUMIN SERPL BCP-MCNC: 4.6 G/DL (ref 3.5–5.2)
ALP SERPL-CCNC: 58 U/L (ref 55–135)
ALT SERPL W/O P-5'-P-CCNC: 28 U/L (ref 10–44)
AST SERPL-CCNC: 37 U/L (ref 10–40)
BILIRUB DIRECT SERPL-MCNC: 0.1 MG/DL (ref 0.1–0.3)
BILIRUB SERPL-MCNC: 0.4 MG/DL (ref 0.1–1)
PROT SERPL-MCNC: 7.6 G/DL (ref 6–8.4)

## 2024-09-13 PROCEDURE — 80076 HEPATIC FUNCTION PANEL: CPT | Performed by: STUDENT IN AN ORGANIZED HEALTH CARE EDUCATION/TRAINING PROGRAM

## 2024-09-13 PROCEDURE — 36415 COLL VENOUS BLD VENIPUNCTURE: CPT | Performed by: STUDENT IN AN ORGANIZED HEALTH CARE EDUCATION/TRAINING PROGRAM

## 2024-09-13 RX ORDER — TERBINAFINE HYDROCHLORIDE 250 MG/1
250 TABLET ORAL DAILY
Qty: 90 TABLET | Refills: 0 | Status: SHIPPED | OUTPATIENT
Start: 2024-09-13 | End: 2024-12-12

## 2024-09-13 NOTE — PROGRESS NOTES
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    Problem    New to [facility]. Presents for fungal nails of both feet x10 plus dry scaly skin of both feet. Has been ongoing for years, has completed multiple rounds of PO antifungals and has also extensively used topical antifungals. Currently at the end of a 2 month course of PO terbinafine.     History    History obtained from patient and review of medical records.     Past Medical History:   Diagnosis Date    Chronic pain of right knee     Possible iliotibial band syndrome (normal MRI)    DJD (degenerative joint disease) of cervical spine     Hyperlipidemia     Occipital neuralgia     Onychomycosis        No past surgical history on file.     Objective:     Vitals  Wt Readings from Last 1 Encounters:   10/04/23 68.4 kg (150 lb 12.7 oz)     Temp Readings from Last 1 Encounters:   11/16/22 98.5 °F (36.9 °C) (Oral)     BP Readings from Last 1 Encounters:   10/04/23 118/86     Pulse Readings from Last 1 Encounters:   10/04/23 73       Dermatological Exam    Skin:  Pedal hair growth, skin color, and skin texture normal on right; dry scaly acral skin  Pedal hair growth, skin color, and skin texture normal on left; dry scaly acral skin    Nails:  10 nail(s) thickened and 10 nail(s) discolored    Vascular Exam    Arteries:  Posterior tibial artery palpable on right  Dorsalis pedis artery palpable on right  Posterior tibial artery palpable on left  Dorsalis pedis artery palpable on left    Veins:  Superficial veins unremarkable on right  Superficial veins unremarkable on left    Swelling:  None on right  None on left    Neurological Exam    [place] touch test:  6/6 sites sensed, light touch intact     Musculoskeletal Exam    Footwear:  Casual on right  Casual on left    Gait Exam:   Ambulatory Status: Ambulatory  Gait: Normal  Assistive Devices: None    Foot Progression Angle:  Normal on right  Normal on left    Right Lower Extremity  Additional Findings:  Right foot and ankle function, strength, and range of motion unremarkable except as noted above.     Left Lower Extremity Additional Findings:  Left foot and ankle function, strength, and range of motion unremarkable except as noted above.    Imaging and Other Tests    Imaging:  Independently reviewed and interpreted imaging, findings are as follows: N/A     Assessment:     Encounter Diagnoses   Name Primary?    Tinea unguium Yes    Tinea pedis of both feet         Plan:     I counseled the patient on his conditions, their implications and medical management.    Tinea unguium: chronic stable     -Suspect that patient will require some amount of continuous PO antifungals until nails are fully cleared.   -Ordered updated LFT, extending PO terbinafine course an additional 3 months, check back at that time.       Return to clinic in 3 months for nail and skin check, call sooner PRN.

## 2024-12-05 RX ORDER — SULFAMETHOXAZOLE AND TRIMETHOPRIM 800; 160 MG/1; MG/1
1 TABLET ORAL 2 TIMES DAILY
Qty: 20 TABLET | Refills: 1 | Status: SHIPPED | OUTPATIENT
Start: 2024-12-05

## 2024-12-05 RX ORDER — PREDNISONE 20 MG/1
20 TABLET ORAL DAILY
Qty: 30 TABLET | Refills: 1 | Status: SHIPPED | OUTPATIENT
Start: 2024-12-05

## 2025-01-28 RX ORDER — OSELTAMIVIR PHOSPHATE 75 MG/1
75 CAPSULE ORAL DAILY
Qty: 10 CAPSULE | Refills: 0 | Status: SHIPPED | OUTPATIENT
Start: 2025-01-28 | End: 2025-02-07

## 2025-03-21 ENCOUNTER — OFFICE VISIT (OUTPATIENT)
Dept: INTERNAL MEDICINE | Facility: CLINIC | Age: 50
End: 2025-03-21
Payer: COMMERCIAL

## 2025-03-21 ENCOUNTER — CLINICAL SUPPORT (OUTPATIENT)
Dept: INTERNAL MEDICINE | Facility: CLINIC | Age: 50
End: 2025-03-21
Payer: COMMERCIAL

## 2025-03-21 VITALS
HEART RATE: 49 BPM | SYSTOLIC BLOOD PRESSURE: 131 MMHG | HEIGHT: 69 IN | WEIGHT: 149.94 LBS | BODY MASS INDEX: 22.21 KG/M2 | DIASTOLIC BLOOD PRESSURE: 81 MMHG

## 2025-03-21 DIAGNOSIS — Z23 NEED FOR DIPHTHERIA-TETANUS-PERTUSSIS (TDAP) VACCINE: ICD-10-CM

## 2025-03-21 DIAGNOSIS — Z13.6 ENCOUNTER FOR SCREENING FOR CARDIOVASCULAR DISORDERS: ICD-10-CM

## 2025-03-21 DIAGNOSIS — Z00.00 ANNUAL PHYSICAL EXAM: Primary | ICD-10-CM

## 2025-03-21 DIAGNOSIS — Z00.00 ENCOUNTER FOR ANNUAL HEALTH EXAMINATION: Primary | ICD-10-CM

## 2025-03-21 DIAGNOSIS — Z12.11 COLON CANCER SCREENING: ICD-10-CM

## 2025-03-21 PROCEDURE — 90715 TDAP VACCINE 7 YRS/> IM: CPT | Mod: S$GLB,,, | Performed by: INTERNAL MEDICINE

## 2025-03-21 PROCEDURE — 90471 IMMUNIZATION ADMIN: CPT | Mod: S$GLB,,, | Performed by: INTERNAL MEDICINE

## 2025-03-21 PROCEDURE — 3008F BODY MASS INDEX DOCD: CPT | Mod: CPTII,S$GLB,, | Performed by: INTERNAL MEDICINE

## 2025-03-21 PROCEDURE — 99999 PR PBB SHADOW E&M-EST. PATIENT-LVL III: CPT | Mod: PBBFAC,,, | Performed by: INTERNAL MEDICINE

## 2025-03-21 PROCEDURE — 1159F MED LIST DOCD IN RCRD: CPT | Mod: CPTII,S$GLB,, | Performed by: INTERNAL MEDICINE

## 2025-03-21 PROCEDURE — 3075F SYST BP GE 130 - 139MM HG: CPT | Mod: CPTII,S$GLB,, | Performed by: INTERNAL MEDICINE

## 2025-03-21 PROCEDURE — 99396 PREV VISIT EST AGE 40-64: CPT | Mod: S$GLB,,, | Performed by: INTERNAL MEDICINE

## 2025-03-21 PROCEDURE — 3079F DIAST BP 80-89 MM HG: CPT | Mod: CPTII,S$GLB,, | Performed by: INTERNAL MEDICINE

## 2025-03-21 PROCEDURE — 99999 PR PBB SHADOW E&M-EST. PATIENT-LVL I: CPT | Mod: PBBFAC,,,

## 2025-03-22 ENCOUNTER — PATIENT MESSAGE (OUTPATIENT)
Dept: INTERNAL MEDICINE | Facility: CLINIC | Age: 50
End: 2025-03-22
Payer: COMMERCIAL

## 2025-03-29 NOTE — PROGRESS NOTES
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    Chief Complaint: Annual Exam      HPI  Annual health exam. Reviewed medical, surgical, social and family history, medications, appropriate preventive health screenings, as well as vaccination history. Updates as noted below or in assessment and plan.    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 Generally well. Kid on the way.   Onychomycosis of toenails treated unsuccessfully with Lamisil previously. Notes he took it for 6 mths.  Having life insurance labs processed currently which is why didn't have [facility] today.    Review of Systems   All other systems reviewed and are negative.      Past Medical History:   Diagnosis Date    Chronic pain of right knee     Possible iliotibial band syndrome (normal MRI)    DJD (degenerative joint disease) of cervical spine     Hyperlipidemia     Occipital neuralgia     Onychomycosis        Current Medications[1]    History reviewed. No pertinent surgical history.    Family History   Problem Relation Name Age of Onset    No Known Problems Mother      Heart disease Father      No Known Problems Sister      No Known Problems Maternal Grandmother      No Known Problems Maternal Grandfather      No Known Problems Paternal Grandmother      Multiple myeloma Paternal Grandfather      No Known Problems Son      No Known Problems Son      Colon cancer Neg Hx         Social History[2]    Immunization History   Administered Date(s) Administered    COVID-19, MRNA, LN-S, PF (Pfizer) (Gray Cap) 07/16/2022    COVID-19, MRNA, LN-S, PF (Pfizer) (Purple Cap) 12/23/2020, 01/12/2021, 09/17/2021    Influenza - Quadrivalent - PF *Preferred* (6 months and older) 01/03/2017, 10/17/2017, 12/03/2018, 11/10/2020, 10/26/2021, 10/11/2022    Tdap 03/21/2025         Objective:      Vitals:    03/21/25 1333   BP: 131/81   Pulse: (!) 49       Physical Exam  Constitutional:       General: He is not in acute distress.     Appearance: Normal  appearance. He is well-developed. He is not ill-appearing.   HENT:      Head: Normocephalic and atraumatic.      Right Ear: Hearing and tympanic membrane normal. There is no impacted cerumen.      Left Ear: Hearing and tympanic membrane normal. There is no impacted cerumen.      Nose: Nose normal.      Mouth/Throat:      Mouth: Mucous membranes are moist.      Pharynx: Oropharynx is clear.   Eyes:      Extraocular Movements: Extraocular movements intact.      Conjunctiva/sclera: Conjunctivae normal.      Pupils: Pupils are equal, round, and reactive to light.   Neck:      Vascular: No carotid bruit.   Cardiovascular:      Rate and Rhythm: Normal rate and regular rhythm.      Heart sounds: Normal heart sounds. No murmur heard.  Pulmonary:      Effort: Pulmonary effort is normal. No respiratory distress.      Breath sounds: Normal breath sounds. No wheezing, rhonchi or rales.   Abdominal:      General: Abdomen is flat. There is no distension.      Palpations: Abdomen is soft. There is no mass.      Tenderness: There is no abdominal tenderness.      Hernia: No hernia is present.   Musculoskeletal:         General: No swelling or deformity. Normal range of motion.      Cervical back: No tenderness.      Right lower leg: No edema.      Left lower leg: No edema.   Lymphadenopathy:      Cervical: No cervical adenopathy.   Skin:     General: Skin is warm and dry.      Findings: No lesion or rash.   Neurological:      General: No focal deficit present.      Mental Status: He is alert and oriented to person, place, and time.      Cranial Nerves: No cranial nerve deficit.      Coordination: Coordination normal.      Gait: Gait normal.      Deep Tendon Reflexes: Reflexes normal.   Psychiatric:         Mood and Affect: Mood normal.         Behavior: Behavior normal.         Thought Content: Thought content normal.         Judgment: Judgment normal.              Assessment/Plan:     1) Annual wellness exam  2) HLD - Having lipids  check with life insurance. Expect they will be high likely based on history. Watches diet. Will repeat CT calcium scoring to help define goals.  3) Onychomycosis - Lamisil not effective. He will see Podiatrist to consider alternative options.    - Vaccines reviewed. Tdap updated today.  - Agreed on FIT testing. Repeat colon screening again 1 yr if normal.  - Having PSA with insurance labs.  - BP normal.         [1] No current outpatient medications on file.  [2]   Social History  Tobacco Use    Smoking status: Never    Smokeless tobacco: Never   Substance Use Topics    Alcohol use: Yes     Alcohol/week: 3.0 standard drinks of alcohol     Types: 3 Glasses of wine per week    Drug use: Never

## 2025-04-03 ENCOUNTER — LAB VISIT (OUTPATIENT)
Dept: LAB | Facility: HOSPITAL | Age: 50
End: 2025-04-03
Attending: INTERNAL MEDICINE
Payer: COMMERCIAL

## 2025-04-03 DIAGNOSIS — Z12.11 COLON CANCER SCREENING: ICD-10-CM

## 2025-04-03 PROCEDURE — 82274 ASSAY TEST FOR BLOOD FECAL: CPT

## 2025-04-04 ENCOUNTER — RESULTS FOLLOW-UP (OUTPATIENT)
Dept: INTERNAL MEDICINE | Facility: CLINIC | Age: 50
End: 2025-04-04

## 2025-04-04 LAB — OB PNL STL IA: NEGATIVE

## 2025-04-09 ENCOUNTER — HOSPITAL ENCOUNTER (OUTPATIENT)
Dept: RADIOLOGY | Facility: OTHER | Age: 50
Discharge: HOME | End: 2025-04-09
Attending: INTERNAL MEDICINE

## 2025-04-09 DIAGNOSIS — Z13.6 ENCOUNTER FOR SCREENING FOR CARDIOVASCULAR DISORDERS: ICD-10-CM

## 2025-04-09 PROCEDURE — 75571 CT HRT W/O DYE W/CA TEST: CPT | Mod: 26,,, | Performed by: RADIOLOGY

## 2025-04-09 PROCEDURE — 75571 CT HRT W/O DYE W/CA TEST: CPT | Mod: TC

## 2025-07-11 ENCOUNTER — OFFICE VISIT (OUTPATIENT)
Dept: PODIATRY | Facility: CLINIC | Age: 50
End: 2025-07-11
Payer: COMMERCIAL

## 2025-07-11 DIAGNOSIS — B35.3 TINEA PEDIS OF BOTH FEET: Primary | ICD-10-CM

## 2025-07-11 DIAGNOSIS — B35.1 TINEA UNGUIUM: ICD-10-CM

## 2025-07-11 PROCEDURE — 99999 PR PBB SHADOW E&M-EST. PATIENT-LVL I: CPT | Mod: PBBFAC,,, | Performed by: PODIATRIST

## 2025-07-11 RX ORDER — CICLOPIROX 80 MG/ML
SOLUTION TOPICAL NIGHTLY
Qty: 6.6 ML | Refills: 11 | Status: SHIPPED | OUTPATIENT
Start: 2025-07-11

## 2025-07-11 RX ORDER — CICLOPIROX 7.7 MG/G
GEL TOPICAL 2 TIMES DAILY
Qty: 100 G | Refills: 3 | Status: SHIPPED | OUTPATIENT
Start: 2025-07-11

## 2025-07-11 NOTE — PROGRESS NOTES
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    Chief Complaint: No chief complaint on file.    Thickened discolored misshapen toenails all 10 toes.  Longstanding chronic condition for many years.  This exacerbations been gradual onset worsening since stopping oral and topical therapy several months ago.  No particular aggravating factor.  Denies trauma and surgery both feet.  He has had very success with topical prescription and over-the-counter products and with several bouts of oral medication in the past always carefully monitor for liver function during the treatment.    Review of Systems   Constitutional: Negative for chills, diaphoresis, fever, malaise/fatigue and night sweats.   Cardiovascular:  Negative for claudication, cyanosis, leg swelling and syncope.   Skin:  Positive for nail changes. Negative for color change, dry skin, rash, suspicious lesions and unusual hair distribution.   Musculoskeletal:  Negative for falls, joint pain, joint swelling, muscle cramps, muscle weakness and stiffness.   Gastrointestinal:  Negative for constipation, diarrhea, nausea and vomiting.   Neurological:  Negative for brief paralysis, disturbances in coordination, focal weakness, numbness, paresthesias, sensory change and tremors.           Objective:      Physical Exam  Constitutional:       General: He is not in acute distress.     Appearance: He is well-developed. He is not diaphoretic.   Cardiovascular:      Pulses:           Popliteal pulses are 2+ on the right side and 2+ on the left side.        Dorsalis pedis pulses are 2+ on the right side and 2+ on the left side.        Posterior tibial pulses are 2+ on the right side and 2+ on the left side.      Comments: Capillary refill 3 seconds all toes/distal feet, all toes/both feet warm to touch.      Negative lymphadenopathy bilateral popliteal fossa and tarsal tunnel.      Negavie lower extremity edema bilateral.    Musculoskeletal:      Right  ankle: No swelling, deformity, ecchymosis or lacerations. Normal range of motion. Normal pulse.      Right Achilles Tendon: Normal. No defects. Agosto's test negative.   Lymphadenopathy:      Lower Body: No right inguinal adenopathy. No left inguinal adenopathy.      Comments: Negative lymphadenopathy bilateral popliteal fossa and tarsal tunnel.    Negative lymphangitic streaking bilateral feet/ankles/legs.   Skin:     General: Skin is warm and dry.      Capillary Refill: Capillary refill takes 2 to 3 seconds.      Coloration: Skin is not pale.      Findings: No abrasion, bruising, burn, ecchymosis, erythema, laceration, lesion or rash.      Nails: There is no clubbing.      Comments: All 10 toenails are moderately thickened discolored irregular trophic qualities with superficial subungual periungual debris without tenderness to nail plate pressure without periungual skin abnormality open skin drainage pus tracking fluctuance malodor signs of infection.      There are minimal changes with superficial skin slough dryness over dull erythematous base of the skin in the plantar forefoot between the toes without open skin or signs of infection.      Otherwise Skin is normal age and health appropriate color, turgor, texture, and temperature bilateral lower extremities without ulceration, hyperpigmentation, discoloration, masses nodules or cords palpated.  No ecchymosis, erythema, edema, or cardinal signs of infection bilateral lower extremities.      Neurological:      Mental Status: He is alert and oriented to person, place, and time.      Sensory: No sensory deficit.      Motor: No tremor, atrophy or abnormal muscle tone.      Gait: Gait normal.      Deep Tendon Reflexes:      Reflex Scores:       Patellar reflexes are 2+ on the right side and 2+ on the left side.       Achilles reflexes are 2+ on the right side and 2+ on the left side.  Psychiatric:         Behavior: Behavior is cooperative.               Assessment:        Encounter Diagnoses   Name Primary?    Tinea pedis of both feet Yes    Tinea unguium          Plan:       Diagnoses and all orders for this visit:    Tinea pedis of both feet  -     Hepatic function panel; Future  -     Hepatic function panel; Future  -     Hepatic function panel; Future    Tinea unguium  -     Hepatic function panel; Future  -     Hepatic function panel; Future  -     Hepatic function panel; Future    Other orders  -     ciclopirox (PENLAC) 8 % Soln; Apply topically nightly.  -     ciclopirox 0.77 % Gel; Apply topically 2 (two) times daily.      I counseled the patient on his conditions, their implications and medical management.        HF fees entered-Lamisil prescription p.o. pending result.      Topical Penlac x1 year.      Topical Loprox gel times 6-8 weeks or until resolution +2 weeks both feet skin.      Dry well after hygiene.      Natural fiber socks changed mid day.      Discussed conservative treatment with shoes of adequate dimensions, material, and style to alleviate symptoms and delay or prevent surgical intervention.           No follow-ups on file.